# Patient Record
Sex: FEMALE | Race: WHITE | NOT HISPANIC OR LATINO | URBAN - METROPOLITAN AREA
[De-identification: names, ages, dates, MRNs, and addresses within clinical notes are randomized per-mention and may not be internally consistent; named-entity substitution may affect disease eponyms.]

---

## 2018-04-17 ENCOUNTER — OFFICE VISIT - HEALTHEAST (OUTPATIENT)
Dept: PEDIATRICS | Facility: CLINIC | Age: 23
End: 2018-04-17

## 2018-04-17 DIAGNOSIS — F41.9 ANXIETY: ICD-10-CM

## 2018-04-17 DIAGNOSIS — E55.9 VITAMIN D DEFICIENCY: ICD-10-CM

## 2018-04-17 DIAGNOSIS — F32.9 DEPRESSION, MAJOR, SINGLE EPISODE: ICD-10-CM

## 2018-04-17 LAB — HGB BLD-MCNC: 13.9 G/DL (ref 12–16)

## 2018-04-17 ASSESSMENT — MIFFLIN-ST. JEOR: SCORE: 1213.11

## 2018-04-18 LAB — 25(OH)D3 SERPL-MCNC: 46 NG/ML (ref 30–80)

## 2018-05-01 ENCOUNTER — OFFICE VISIT - HEALTHEAST (OUTPATIENT)
Dept: PEDIATRICS | Facility: CLINIC | Age: 23
End: 2018-05-01

## 2018-05-01 DIAGNOSIS — F32.9 DEPRESSION, MAJOR, SINGLE EPISODE: ICD-10-CM

## 2018-05-01 DIAGNOSIS — N92.1 METRORRHAGIA: ICD-10-CM

## 2018-05-01 DIAGNOSIS — F41.9 ANXIETY: ICD-10-CM

## 2018-05-01 DIAGNOSIS — R63.4 WEIGHT LOSS, UNINTENTIONAL: ICD-10-CM

## 2018-05-01 ASSESSMENT — MIFFLIN-ST. JEOR: SCORE: 1200.86

## 2018-05-16 ENCOUNTER — COMMUNICATION - HEALTHEAST (OUTPATIENT)
Dept: PEDIATRICS | Facility: CLINIC | Age: 23
End: 2018-05-16

## 2018-05-16 DIAGNOSIS — F32.A DEPRESSION: ICD-10-CM

## 2018-06-18 ENCOUNTER — COMMUNICATION - HEALTHEAST (OUTPATIENT)
Dept: PEDIATRICS | Facility: CLINIC | Age: 23
End: 2018-06-18

## 2018-06-18 DIAGNOSIS — F32.A DEPRESSION: ICD-10-CM

## 2019-08-05 ENCOUNTER — COMMUNICATION - HEALTHEAST (OUTPATIENT)
Dept: SCHEDULING | Facility: CLINIC | Age: 24
End: 2019-08-05

## 2019-08-06 ENCOUNTER — OFFICE VISIT - HEALTHEAST (OUTPATIENT)
Dept: FAMILY MEDICINE | Facility: CLINIC | Age: 24
End: 2019-08-06

## 2019-08-06 DIAGNOSIS — N10 ACUTE PYELONEPHRITIS: ICD-10-CM

## 2019-08-06 LAB
ALBUMIN UR-MCNC: ABNORMAL MG/DL
APPEARANCE UR: ABNORMAL
BACTERIA #/AREA URNS HPF: ABNORMAL HPF
BILIRUB UR QL STRIP: ABNORMAL
COLOR UR AUTO: YELLOW
GLUCOSE UR STRIP-MCNC: ABNORMAL MG/DL
GRAN CASTS #/AREA URNS LPF: ABNORMAL LPF
HGB UR QL STRIP: ABNORMAL
KETONES UR STRIP-MCNC: NEGATIVE MG/DL
LEUKOCYTE ESTERASE UR QL STRIP: NEGATIVE
NITRATE UR QL: NEGATIVE
PH UR STRIP: 6 [PH] (ref 5–8)
RBC #/AREA URNS AUTO: ABNORMAL HPF
SP GR UR STRIP: >=1.03 (ref 1–1.03)
SQUAMOUS #/AREA URNS AUTO: >100 LPF
UROBILINOGEN UR STRIP-ACNC: ABNORMAL
WBC #/AREA URNS AUTO: ABNORMAL HPF
WBC CLUMPS #/AREA URNS HPF: PRESENT /[HPF]

## 2019-08-07 LAB — BACTERIA SPEC CULT: NO GROWTH

## 2019-10-08 ENCOUNTER — OFFICE VISIT - HEALTHEAST (OUTPATIENT)
Dept: FAMILY MEDICINE | Facility: CLINIC | Age: 24
End: 2019-10-08

## 2019-10-08 DIAGNOSIS — R10.11 ABDOMINAL PAIN, RIGHT UPPER QUADRANT: ICD-10-CM

## 2019-10-08 DIAGNOSIS — R11.2 NON-INTRACTABLE VOMITING WITH NAUSEA, UNSPECIFIED VOMITING TYPE: ICD-10-CM

## 2019-10-08 DIAGNOSIS — R10.13 EPIGASTRIC PAIN: ICD-10-CM

## 2019-10-08 LAB
ALBUMIN SERPL-MCNC: 4.4 G/DL (ref 3.5–5)
ALP SERPL-CCNC: 54 U/L (ref 45–120)
ALT SERPL W P-5'-P-CCNC: 21 U/L (ref 0–45)
ANION GAP SERPL CALCULATED.3IONS-SCNC: 15 MMOL/L (ref 5–18)
AST SERPL W P-5'-P-CCNC: 17 U/L (ref 0–40)
BASOPHILS # BLD AUTO: 0 THOU/UL (ref 0–0.2)
BASOPHILS NFR BLD AUTO: 0 % (ref 0–2)
BILIRUB SERPL-MCNC: 0.8 MG/DL (ref 0–1)
BUN SERPL-MCNC: 11 MG/DL (ref 8–22)
CALCIUM SERPL-MCNC: 9.6 MG/DL (ref 8.5–10.5)
CHLORIDE BLD-SCNC: 104 MMOL/L (ref 98–107)
CO2 SERPL-SCNC: 20 MMOL/L (ref 22–31)
CREAT SERPL-MCNC: 0.78 MG/DL (ref 0.6–1.1)
EOSINOPHIL COUNT (ABSOLUTE): 0 THOU/UL (ref 0–0.4)
EOSINOPHIL NFR BLD AUTO: 0 % (ref 0–6)
ERYTHROCYTE [DISTWIDTH] IN BLOOD BY AUTOMATED COUNT: 12.7 % (ref 11–14.5)
FLUAV AG SPEC QL IA: NORMAL
FLUBV AG SPEC QL IA: NORMAL
GFR SERPL CREATININE-BSD FRML MDRD: >60 ML/MIN/1.73M2
GLUCOSE BLD-MCNC: 126 MG/DL (ref 70–125)
HCT VFR BLD AUTO: 41.6 % (ref 35–47)
HGB BLD-MCNC: 13.5 G/DL (ref 12–16)
LYMPHOCYTES # BLD AUTO: 0.4 THOU/UL (ref 0.8–4.4)
LYMPHOCYTES NFR BLD AUTO: 2 % (ref 20–40)
MCH RBC QN AUTO: 30.3 PG (ref 27–34)
MCHC RBC AUTO-ENTMCNC: 32.5 G/DL (ref 32–36)
MCV RBC AUTO: 94 FL (ref 80–100)
MONOCYTES # BLD AUTO: 1.1 THOU/UL (ref 0–0.9)
MONOCYTES NFR BLD AUTO: 5 % (ref 2–10)
PLAT MORPH BLD: NORMAL
PLATELET # BLD AUTO: 276 THOU/UL (ref 140–440)
PMV BLD AUTO: 12.9 FL (ref 8.5–12.5)
POTASSIUM BLD-SCNC: 4.5 MMOL/L (ref 3.5–5)
PROT SERPL-MCNC: 7.9 G/DL (ref 6–8)
RBC # BLD AUTO: 4.45 MILL/UL (ref 3.8–5.4)
SODIUM SERPL-SCNC: 139 MMOL/L (ref 136–145)
TOTAL NEUTROPHILS-ABS(DIFF): 20.4 THOU/UL (ref 2–7.7)
TOTAL NEUTROPHILS-REL(DIFF): 93 % (ref 50–70)
WBC: 21.9 THOU/UL (ref 4–11)

## 2019-10-08 ASSESSMENT — MIFFLIN-ST. JEOR: SCORE: 1217.19

## 2019-10-09 ENCOUNTER — COMMUNICATION - HEALTHEAST (OUTPATIENT)
Dept: FAMILY MEDICINE | Facility: CLINIC | Age: 24
End: 2019-10-09

## 2019-11-05 ENCOUNTER — COMMUNICATION - HEALTHEAST (OUTPATIENT)
Dept: PEDIATRICS | Facility: CLINIC | Age: 24
End: 2019-11-05

## 2019-11-05 DIAGNOSIS — F32.A DEPRESSION: ICD-10-CM

## 2019-11-19 ENCOUNTER — OFFICE VISIT - HEALTHEAST (OUTPATIENT)
Dept: FAMILY MEDICINE | Facility: CLINIC | Age: 24
End: 2019-11-19

## 2019-11-19 DIAGNOSIS — F33.1 MODERATE EPISODE OF RECURRENT MAJOR DEPRESSIVE DISORDER (H): ICD-10-CM

## 2019-11-19 DIAGNOSIS — F41.9 ANXIETY: ICD-10-CM

## 2019-11-19 ASSESSMENT — PATIENT HEALTH QUESTIONNAIRE - PHQ9: SUM OF ALL RESPONSES TO PHQ QUESTIONS 1-9: 15

## 2019-11-19 ASSESSMENT — ANXIETY QUESTIONNAIRES
2. NOT BEING ABLE TO STOP OR CONTROL WORRYING: NEARLY EVERY DAY
6. BECOMING EASILY ANNOYED OR IRRITABLE: NEARLY EVERY DAY
GAD7 TOTAL SCORE: 16
4. TROUBLE RELAXING: NEARLY EVERY DAY
1. FEELING NERVOUS, ANXIOUS, OR ON EDGE: NEARLY EVERY DAY
7. FEELING AFRAID AS IF SOMETHING AWFUL MIGHT HAPPEN: SEVERAL DAYS
5. BEING SO RESTLESS THAT IT IS HARD TO SIT STILL: SEVERAL DAYS
3. WORRYING TOO MUCH ABOUT DIFFERENT THINGS: MORE THAN HALF THE DAYS

## 2020-01-07 ENCOUNTER — OFFICE VISIT - HEALTHEAST (OUTPATIENT)
Dept: FAMILY MEDICINE | Facility: CLINIC | Age: 25
End: 2020-01-07

## 2020-01-07 DIAGNOSIS — F33.1 MODERATE EPISODE OF RECURRENT MAJOR DEPRESSIVE DISORDER (H): ICD-10-CM

## 2020-01-07 DIAGNOSIS — F41.9 ANXIETY: ICD-10-CM

## 2020-01-07 ASSESSMENT — ANXIETY QUESTIONNAIRES
6. BECOMING EASILY ANNOYED OR IRRITABLE: NEARLY EVERY DAY
3. WORRYING TOO MUCH ABOUT DIFFERENT THINGS: NEARLY EVERY DAY
2. NOT BEING ABLE TO STOP OR CONTROL WORRYING: NEARLY EVERY DAY
IF YOU CHECKED OFF ANY PROBLEMS ON THIS QUESTIONNAIRE, HOW DIFFICULT HAVE THESE PROBLEMS MADE IT FOR YOU TO DO YOUR WORK, TAKE CARE OF THINGS AT HOME, OR GET ALONG WITH OTHER PEOPLE: VERY DIFFICULT
5. BEING SO RESTLESS THAT IT IS HARD TO SIT STILL: MORE THAN HALF THE DAYS
1. FEELING NERVOUS, ANXIOUS, OR ON EDGE: NEARLY EVERY DAY
GAD7 TOTAL SCORE: 19
7. FEELING AFRAID AS IF SOMETHING AWFUL MIGHT HAPPEN: NEARLY EVERY DAY
4. TROUBLE RELAXING: MORE THAN HALF THE DAYS

## 2020-01-07 ASSESSMENT — PATIENT HEALTH QUESTIONNAIRE - PHQ9: SUM OF ALL RESPONSES TO PHQ QUESTIONS 1-9: 15

## 2020-02-03 ENCOUNTER — COMMUNICATION - HEALTHEAST (OUTPATIENT)
Dept: FAMILY MEDICINE | Facility: CLINIC | Age: 25
End: 2020-02-03

## 2020-02-11 ENCOUNTER — PRENATAL OFFICE VISIT - HEALTHEAST (OUTPATIENT)
Dept: FAMILY MEDICINE | Facility: CLINIC | Age: 25
End: 2020-02-11

## 2020-02-11 DIAGNOSIS — F32.1 MODERATE MAJOR DEPRESSION (H): ICD-10-CM

## 2020-02-11 DIAGNOSIS — N91.2 AMENORRHEA: ICD-10-CM

## 2020-02-11 DIAGNOSIS — F41.9 ANXIETY: ICD-10-CM

## 2020-02-11 LAB
HCG SERPL QL: ABNORMAL
HCG UR QL: NEGATIVE

## 2020-02-11 ASSESSMENT — PATIENT HEALTH QUESTIONNAIRE - PHQ9: SUM OF ALL RESPONSES TO PHQ QUESTIONS 1-9: 21

## 2020-02-11 ASSESSMENT — ANXIETY QUESTIONNAIRES
4. TROUBLE RELAXING: NEARLY EVERY DAY
2. NOT BEING ABLE TO STOP OR CONTROL WORRYING: NEARLY EVERY DAY
6. BECOMING EASILY ANNOYED OR IRRITABLE: NEARLY EVERY DAY
GAD7 TOTAL SCORE: 20
1. FEELING NERVOUS, ANXIOUS, OR ON EDGE: NEARLY EVERY DAY
3. WORRYING TOO MUCH ABOUT DIFFERENT THINGS: NEARLY EVERY DAY
7. FEELING AFRAID AS IF SOMETHING AWFUL MIGHT HAPPEN: NEARLY EVERY DAY
5. BEING SO RESTLESS THAT IT IS HARD TO SIT STILL: MORE THAN HALF THE DAYS

## 2020-02-12 ENCOUNTER — AMBULATORY - HEALTHEAST (OUTPATIENT)
Dept: LAB | Facility: CLINIC | Age: 25
End: 2020-02-12

## 2020-02-12 ENCOUNTER — COMMUNICATION - HEALTHEAST (OUTPATIENT)
Dept: FAMILY MEDICINE | Facility: CLINIC | Age: 25
End: 2020-02-12

## 2020-02-12 ENCOUNTER — AMBULATORY - HEALTHEAST (OUTPATIENT)
Dept: FAMILY MEDICINE | Facility: CLINIC | Age: 25
End: 2020-02-12

## 2020-02-12 DIAGNOSIS — N91.2 AMENORRHEA: ICD-10-CM

## 2020-02-12 LAB — HCG SERPL-ACNC: 13 MLU/ML (ref 0–4)

## 2020-02-13 ENCOUNTER — AMBULATORY - HEALTHEAST (OUTPATIENT)
Dept: FAMILY MEDICINE | Facility: CLINIC | Age: 25
End: 2020-02-13

## 2020-02-13 DIAGNOSIS — N91.2 AMENORRHEA: ICD-10-CM

## 2020-02-20 ENCOUNTER — AMBULATORY - HEALTHEAST (OUTPATIENT)
Dept: LAB | Facility: CLINIC | Age: 25
End: 2020-02-20

## 2020-02-20 DIAGNOSIS — N91.2 AMENORRHEA: ICD-10-CM

## 2020-02-20 LAB — HCG SERPL-ACNC: 3 MLU/ML (ref 0–4)

## 2020-02-21 ENCOUNTER — COMMUNICATION - HEALTHEAST (OUTPATIENT)
Dept: FAMILY MEDICINE | Facility: CLINIC | Age: 25
End: 2020-02-21

## 2020-02-25 ENCOUNTER — OFFICE VISIT - HEALTHEAST (OUTPATIENT)
Dept: FAMILY MEDICINE | Facility: CLINIC | Age: 25
End: 2020-02-25

## 2020-02-25 DIAGNOSIS — F32.1 MODERATE MAJOR DEPRESSION (H): ICD-10-CM

## 2020-02-25 DIAGNOSIS — Z30.013 ENCOUNTER FOR INITIAL PRESCRIPTION OF INJECTABLE CONTRACEPTIVE: ICD-10-CM

## 2020-02-25 DIAGNOSIS — F41.9 ANXIETY: ICD-10-CM

## 2020-02-25 ASSESSMENT — ANXIETY QUESTIONNAIRES
6. BECOMING EASILY ANNOYED OR IRRITABLE: NEARLY EVERY DAY
4. TROUBLE RELAXING: NEARLY EVERY DAY
3. WORRYING TOO MUCH ABOUT DIFFERENT THINGS: NEARLY EVERY DAY
2. NOT BEING ABLE TO STOP OR CONTROL WORRYING: NEARLY EVERY DAY
1. FEELING NERVOUS, ANXIOUS, OR ON EDGE: NEARLY EVERY DAY
5. BEING SO RESTLESS THAT IT IS HARD TO SIT STILL: MORE THAN HALF THE DAYS
GAD7 TOTAL SCORE: 19
7. FEELING AFRAID AS IF SOMETHING AWFUL MIGHT HAPPEN: MORE THAN HALF THE DAYS

## 2020-02-25 ASSESSMENT — PATIENT HEALTH QUESTIONNAIRE - PHQ9: SUM OF ALL RESPONSES TO PHQ QUESTIONS 1-9: 19

## 2020-05-10 ENCOUNTER — COMMUNICATION - HEALTHEAST (OUTPATIENT)
Dept: FAMILY MEDICINE | Facility: CLINIC | Age: 25
End: 2020-05-10

## 2020-05-12 ENCOUNTER — COMMUNICATION - HEALTHEAST (OUTPATIENT)
Dept: FAMILY MEDICINE | Facility: CLINIC | Age: 25
End: 2020-05-12

## 2020-05-13 ENCOUNTER — OFFICE VISIT - HEALTHEAST (OUTPATIENT)
Dept: FAMILY MEDICINE | Facility: CLINIC | Age: 25
End: 2020-05-13

## 2020-05-13 DIAGNOSIS — F32.1 MODERATE MAJOR DEPRESSION (H): ICD-10-CM

## 2020-05-13 DIAGNOSIS — F41.9 ANXIETY: ICD-10-CM

## 2020-05-13 ASSESSMENT — ANXIETY QUESTIONNAIRES
2. NOT BEING ABLE TO STOP OR CONTROL WORRYING: NEARLY EVERY DAY
4. TROUBLE RELAXING: MORE THAN HALF THE DAYS
7. FEELING AFRAID AS IF SOMETHING AWFUL MIGHT HAPPEN: NEARLY EVERY DAY
5. BEING SO RESTLESS THAT IT IS HARD TO SIT STILL: NEARLY EVERY DAY
GAD7 TOTAL SCORE: 20
1. FEELING NERVOUS, ANXIOUS, OR ON EDGE: NEARLY EVERY DAY
6. BECOMING EASILY ANNOYED OR IRRITABLE: NEARLY EVERY DAY
3. WORRYING TOO MUCH ABOUT DIFFERENT THINGS: NEARLY EVERY DAY

## 2020-05-13 ASSESSMENT — PATIENT HEALTH QUESTIONNAIRE - PHQ9: SUM OF ALL RESPONSES TO PHQ QUESTIONS 1-9: 21

## 2020-08-03 ENCOUNTER — COMMUNICATION - HEALTHEAST (OUTPATIENT)
Dept: FAMILY MEDICINE | Facility: CLINIC | Age: 25
End: 2020-08-03

## 2020-08-05 ENCOUNTER — OFFICE VISIT - HEALTHEAST (OUTPATIENT)
Dept: FAMILY MEDICINE | Facility: CLINIC | Age: 25
End: 2020-08-05

## 2020-08-05 DIAGNOSIS — F41.9 ANXIETY: ICD-10-CM

## 2020-08-05 DIAGNOSIS — R00.0 TACHYCARDIA: ICD-10-CM

## 2020-08-05 DIAGNOSIS — F32.1 MODERATE MAJOR DEPRESSION (H): ICD-10-CM

## 2020-08-05 LAB — TSH SERPL DL<=0.005 MIU/L-ACNC: 1.16 UIU/ML (ref 0.3–5)

## 2020-08-05 ASSESSMENT — ANXIETY QUESTIONNAIRES
2. NOT BEING ABLE TO STOP OR CONTROL WORRYING: NEARLY EVERY DAY
7. FEELING AFRAID AS IF SOMETHING AWFUL MIGHT HAPPEN: NEARLY EVERY DAY
1. FEELING NERVOUS, ANXIOUS, OR ON EDGE: NEARLY EVERY DAY
IF YOU CHECKED OFF ANY PROBLEMS ON THIS QUESTIONNAIRE, HOW DIFFICULT HAVE THESE PROBLEMS MADE IT FOR YOU TO DO YOUR WORK, TAKE CARE OF THINGS AT HOME, OR GET ALONG WITH OTHER PEOPLE: EXTREMELY DIFFICULT
3. WORRYING TOO MUCH ABOUT DIFFERENT THINGS: NEARLY EVERY DAY
4. TROUBLE RELAXING: NEARLY EVERY DAY
GAD7 TOTAL SCORE: 21
6. BECOMING EASILY ANNOYED OR IRRITABLE: NEARLY EVERY DAY
5. BEING SO RESTLESS THAT IT IS HARD TO SIT STILL: NEARLY EVERY DAY

## 2020-08-05 ASSESSMENT — PATIENT HEALTH QUESTIONNAIRE - PHQ9: SUM OF ALL RESPONSES TO PHQ QUESTIONS 1-9: 24

## 2020-09-01 ENCOUNTER — OFFICE VISIT - HEALTHEAST (OUTPATIENT)
Dept: BEHAVIORAL HEALTH | Facility: CLINIC | Age: 25
End: 2020-09-01

## 2020-09-01 DIAGNOSIS — F32.1 CURRENT MODERATE EPISODE OF MAJOR DEPRESSIVE DISORDER WITHOUT PRIOR EPISODE (H): ICD-10-CM

## 2020-09-01 DIAGNOSIS — F32.1 MODERATE MAJOR DEPRESSION (H): ICD-10-CM

## 2020-09-01 DIAGNOSIS — F41.9 ANXIETY: ICD-10-CM

## 2020-09-01 ASSESSMENT — PATIENT HEALTH QUESTIONNAIRE - PHQ9: SUM OF ALL RESPONSES TO PHQ QUESTIONS 1-9: 24

## 2020-09-01 ASSESSMENT — ANXIETY QUESTIONNAIRES
6. BECOMING EASILY ANNOYED OR IRRITABLE: NEARLY EVERY DAY
1. FEELING NERVOUS, ANXIOUS, OR ON EDGE: NEARLY EVERY DAY
7. FEELING AFRAID AS IF SOMETHING AWFUL MIGHT HAPPEN: NEARLY EVERY DAY
4. TROUBLE RELAXING: NEARLY EVERY DAY
2. NOT BEING ABLE TO STOP OR CONTROL WORRYING: NEARLY EVERY DAY
3. WORRYING TOO MUCH ABOUT DIFFERENT THINGS: NEARLY EVERY DAY
GAD7 TOTAL SCORE: 21
5. BEING SO RESTLESS THAT IT IS HARD TO SIT STILL: NEARLY EVERY DAY

## 2020-09-18 ENCOUNTER — COMMUNICATION - HEALTHEAST (OUTPATIENT)
Dept: FAMILY MEDICINE | Facility: CLINIC | Age: 25
End: 2020-09-18

## 2020-09-18 DIAGNOSIS — F41.9 ANXIETY: ICD-10-CM

## 2020-09-18 DIAGNOSIS — F32.1 MODERATE MAJOR DEPRESSION (H): ICD-10-CM

## 2020-09-21 ENCOUNTER — COMMUNICATION - HEALTHEAST (OUTPATIENT)
Dept: FAMILY MEDICINE | Facility: CLINIC | Age: 25
End: 2020-09-21

## 2020-10-28 ENCOUNTER — OFFICE VISIT - HEALTHEAST (OUTPATIENT)
Dept: BEHAVIORAL HEALTH | Facility: CLINIC | Age: 25
End: 2020-10-28

## 2020-10-28 DIAGNOSIS — F12.90 USES MARIJUANA: ICD-10-CM

## 2020-10-28 DIAGNOSIS — F32.1 MODERATE MAJOR DEPRESSION (H): ICD-10-CM

## 2020-10-28 DIAGNOSIS — F41.0 PANIC ATTACKS: ICD-10-CM

## 2020-10-28 DIAGNOSIS — F41.9 ANXIETY: ICD-10-CM

## 2020-10-28 ASSESSMENT — ANXIETY QUESTIONNAIRES
4. TROUBLE RELAXING: NEARLY EVERY DAY
3. WORRYING TOO MUCH ABOUT DIFFERENT THINGS: NEARLY EVERY DAY
6. BECOMING EASILY ANNOYED OR IRRITABLE: NEARLY EVERY DAY
2. NOT BEING ABLE TO STOP OR CONTROL WORRYING: NEARLY EVERY DAY
1. FEELING NERVOUS, ANXIOUS, OR ON EDGE: NEARLY EVERY DAY
7. FEELING AFRAID AS IF SOMETHING AWFUL MIGHT HAPPEN: NEARLY EVERY DAY
5. BEING SO RESTLESS THAT IT IS HARD TO SIT STILL: MORE THAN HALF THE DAYS
GAD7 TOTAL SCORE: 20

## 2020-10-28 ASSESSMENT — PATIENT HEALTH QUESTIONNAIRE - PHQ9: SUM OF ALL RESPONSES TO PHQ QUESTIONS 1-9: 21

## 2020-10-29 ENCOUNTER — COMMUNICATION - HEALTHEAST (OUTPATIENT)
Dept: BEHAVIORAL HEALTH | Facility: CLINIC | Age: 25
End: 2020-10-29

## 2020-11-05 ENCOUNTER — COMMUNICATION - HEALTHEAST (OUTPATIENT)
Dept: BEHAVIORAL HEALTH | Facility: CLINIC | Age: 25
End: 2020-11-05

## 2020-11-05 DIAGNOSIS — F32.1 CURRENT MODERATE EPISODE OF MAJOR DEPRESSIVE DISORDER WITHOUT PRIOR EPISODE (H): ICD-10-CM

## 2021-01-23 ENCOUNTER — COMMUNICATION - HEALTHEAST (OUTPATIENT)
Dept: FAMILY MEDICINE | Facility: CLINIC | Age: 26
End: 2021-01-23

## 2021-02-12 ENCOUNTER — OFFICE VISIT - HEALTHEAST (OUTPATIENT)
Dept: BEHAVIORAL HEALTH | Facility: CLINIC | Age: 26
End: 2021-02-12

## 2021-02-12 DIAGNOSIS — F41.0 PANIC ATTACKS: ICD-10-CM

## 2021-02-12 DIAGNOSIS — F32.1 CURRENT MODERATE EPISODE OF MAJOR DEPRESSIVE DISORDER WITHOUT PRIOR EPISODE (H): ICD-10-CM

## 2021-02-12 RX ORDER — BUSPIRONE HYDROCHLORIDE 10 MG/1
10 TABLET ORAL 3 TIMES DAILY PRN
Qty: 90 TABLET | Refills: 0 | Status: SHIPPED | OUTPATIENT
Start: 2021-02-12

## 2021-02-12 ASSESSMENT — ANXIETY QUESTIONNAIRES
2. NOT BEING ABLE TO STOP OR CONTROL WORRYING: NEARLY EVERY DAY
GAD7 TOTAL SCORE: 21
3. WORRYING TOO MUCH ABOUT DIFFERENT THINGS: NEARLY EVERY DAY
7. FEELING AFRAID AS IF SOMETHING AWFUL MIGHT HAPPEN: NEARLY EVERY DAY
IF YOU CHECKED OFF ANY PROBLEMS ON THIS QUESTIONNAIRE, HOW DIFFICULT HAVE THESE PROBLEMS MADE IT FOR YOU TO DO YOUR WORK, TAKE CARE OF THINGS AT HOME, OR GET ALONG WITH OTHER PEOPLE: EXTREMELY DIFFICULT
6. BECOMING EASILY ANNOYED OR IRRITABLE: NEARLY EVERY DAY
4. TROUBLE RELAXING: NEARLY EVERY DAY
5. BEING SO RESTLESS THAT IT IS HARD TO SIT STILL: NEARLY EVERY DAY
1. FEELING NERVOUS, ANXIOUS, OR ON EDGE: NEARLY EVERY DAY

## 2021-02-12 ASSESSMENT — PATIENT HEALTH QUESTIONNAIRE - PHQ9: SUM OF ALL RESPONSES TO PHQ QUESTIONS 1-9: 23

## 2021-03-11 ENCOUNTER — COMMUNICATION - HEALTHEAST (OUTPATIENT)
Dept: BEHAVIORAL HEALTH | Facility: CLINIC | Age: 26
End: 2021-03-11

## 2021-03-11 DIAGNOSIS — F32.1 CURRENT MODERATE EPISODE OF MAJOR DEPRESSIVE DISORDER WITHOUT PRIOR EPISODE (H): ICD-10-CM

## 2021-03-18 ENCOUNTER — COMMUNICATION - HEALTHEAST (OUTPATIENT)
Dept: BEHAVIORAL HEALTH | Facility: CLINIC | Age: 26
End: 2021-03-18

## 2021-04-19 ENCOUNTER — COMMUNICATION - HEALTHEAST (OUTPATIENT)
Dept: BEHAVIORAL HEALTH | Facility: CLINIC | Age: 26
End: 2021-04-19

## 2021-04-19 DIAGNOSIS — F41.0 PANIC ATTACKS: ICD-10-CM

## 2021-04-19 DIAGNOSIS — F32.1 CURRENT MODERATE EPISODE OF MAJOR DEPRESSIVE DISORDER WITHOUT PRIOR EPISODE (H): ICD-10-CM

## 2021-05-26 ENCOUNTER — COMMUNICATION - HEALTHEAST (OUTPATIENT)
Dept: BEHAVIORAL HEALTH | Facility: CLINIC | Age: 26
End: 2021-05-26

## 2021-05-26 DIAGNOSIS — F32.1 CURRENT MODERATE EPISODE OF MAJOR DEPRESSIVE DISORDER WITHOUT PRIOR EPISODE (H): ICD-10-CM

## 2021-05-26 RX ORDER — ESCITALOPRAM OXALATE 20 MG/1
20 TABLET ORAL DAILY
Qty: 30 TABLET | Refills: 0 | Status: SHIPPED | OUTPATIENT
Start: 2021-05-26

## 2021-05-26 ASSESSMENT — PATIENT HEALTH QUESTIONNAIRE - PHQ9: SUM OF ALL RESPONSES TO PHQ QUESTIONS 1-9: 15

## 2021-05-27 ASSESSMENT — PATIENT HEALTH QUESTIONNAIRE - PHQ9
SUM OF ALL RESPONSES TO PHQ QUESTIONS 1-9: 19
SUM OF ALL RESPONSES TO PHQ QUESTIONS 1-9: 23
SUM OF ALL RESPONSES TO PHQ QUESTIONS 1-9: 21
SUM OF ALL RESPONSES TO PHQ QUESTIONS 1-9: 24
SUM OF ALL RESPONSES TO PHQ QUESTIONS 1-9: 15
SUM OF ALL RESPONSES TO PHQ QUESTIONS 1-9: 21
SUM OF ALL RESPONSES TO PHQ QUESTIONS 1-9: 21
SUM OF ALL RESPONSES TO PHQ QUESTIONS 1-9: 24

## 2021-05-28 ASSESSMENT — ANXIETY QUESTIONNAIRES
GAD7 TOTAL SCORE: 21
GAD7 TOTAL SCORE: 16
GAD7 TOTAL SCORE: 21
GAD7 TOTAL SCORE: 19
GAD7 TOTAL SCORE: 21
GAD7 TOTAL SCORE: 20
GAD7 TOTAL SCORE: 19

## 2021-05-31 NOTE — TELEPHONE ENCOUNTER
"Pt is calling in with fever of 103, chills, and left lower abdominal and back pain when breathing and walking. Pt rates pain \"8\". Pt had her IUD taken out last Monday, and Saturday she started experiencing these symptoms. Pt is not pregnant, and denies any vaginal bleeding or discharge.   Per protocol pt should be evaluated within 4 hours. Pt agrees with the plan, and will go to the ER. Advised pt to call back if she has further questions.     Butch Ortega RN [facility] Triage/Medication Refill    Reason for Disposition    [1] Constant abdominal pain AND [2] present > 2 hours    Protocols used: CONTRACEPTION - IUD SYMPTOMS AND [id number]      "

## 2021-05-31 NOTE — PROGRESS NOTES
Assessment:   1. Acute pyelonephritis  Patient has not responded to injection that was given at the ED. Recommend oral therapy and follow up with urine culture result.   - ciprofloxacin HCl (CIPRO) 500 MG tablet; Take 1 tablet (500 mg total) by mouth 2 (two) times a day for 10 days.  Dispense: 20 tablet; Refill: 0  - Urinalysis-UC if Indicated     Plan:  Plan:   1. Medications: ciprofloxacin  2. Maintain adequate hydration  3. Follow up if symptoms not improving, and prn.     Subjective:   Janet Rivera is a 23 y.o. female who complains of bilateral flank pain, foul smelling urine, frequency, nausea, pain in the left flank and in the right flank and urgency for 5 days. She was seen at the [facility] yesterday and ws referred to the ED where she was given an injection and was sent home. She returns today stating that her symptoms have not improved that she is having lots of pain shivering and with fever and chills.    The following portions of the patient's history were reviewed and updated as appropriate: allergies, current medications, past family history, past medical history, past social history, past surgical history and problem list.  Review of Systems  A 12 point comprehensive review of systems was negative except as noted.      Objective:      /69   Pulse (!) 137   Temp (!) 103  F (39.4  C) (Oral)   Wt 114 lb 6.4 oz (51.9 kg)   SpO2 98%   BMI 20.27 kg/m    General: alert, appears stated age and cooperative                 Lab work from ED showed elevated WBC

## 2021-06-01 VITALS — HEIGHT: 63 IN | WEIGHT: 105.1 LBS | BODY MASS INDEX: 18.62 KG/M2

## 2021-06-01 VITALS — BODY MASS INDEX: 19.1 KG/M2 | HEIGHT: 63 IN | WEIGHT: 107.8 LBS

## 2021-06-02 NOTE — PATIENT INSTRUCTIONS - HE
Blood test and nasal swab today - will call with test results    Symptom control:  -LOTS of water  -rest as able  -ok to use tylenol 1000mg three times a day and/or ibuprofen 600mg three times a day with meals as needed  -start zofran dissolvable for nausea/vomiting

## 2021-06-02 NOTE — TELEPHONE ENCOUNTER
Reason contacted:  Test results within this encounter   Information relayed:  Dr Ramos notes below. Pt stated she is feeling better and has no questions   Additional questions:  No  Further follow-up needed:  No  Okay to leave a detailed message:  No

## 2021-06-02 NOTE — PROGRESS NOTES
Assessment/Plan:    1. Abdominal pain, right upper quadrant  2. Epigastric pain  Unclear etiology at this time.  Differential includes: Influenza, viral gastroenteritis, cholecystitis, etc. Unlikely to be urinary given absence of urinary symptoms.  Vitals stable though patient currently tachycardic.  Recommend evaluation with testing as below.  Encourage patient to drink lots of water, adequate rest, Tylenol/ibuprofen as needed.  Discussed testing will likely point test down specific treatment path; recommend patient to call clinic or present to ER if symptoms worsen significantly.  - Comprehensive Metabolic Panel  - HM1(CBC and Differential)  - Influenza A/B Rapid Test- Nasal Swab  - HM1 (CBC with Diff)    3. Non-intractable vomiting with nausea, unspecified vomiting type  Will manage nausea with Zofran as below.  - ondansetron (ZOFRAN-ODT) 4 MG disintegrating tablet; Take 1 tablet (4 mg total) by mouth every 8 (eight) hours as needed for nausea.  Dispense: 20 tablet; Refill: 0      Follow up: pending test results    Love Benitez MD  [facility]    Subjective:    Patient ID: Janet Rivera is a 24 y.o. female is here today for abd pain, vomiting    abd pain, nausea/vomiting  -around 1am woke up with abd pain and nausea - vomited  -vomiting every 15-20 mins this morning - a little bloody at first but states may have been red from food  -mild amount diarrhea at 4am - no black or bloody  -head feels plugged and body feels heavy  -has chills but not necessarily feverish  -grandma gave her something for nausea but threw it up  -no recent travel  -recently ate a few snacks - ice cream, chips, cheese stick - no chinese food, yesterday ate a chicken breast with caesar salad at work  -no sick contacts  -no further issues from kidney infection in Aug 2019   -abd pain epigastric to RUQ - intermittent, lasts couple minutes      Patient Active Problem List   Diagnosis     Improper Nutrition     Acne      Dysmenorrhea     Weight loss, unintentional     Anxiety     Depression, major, single episode     Vitamin D deficiency     History reviewed. No pertinent surgical history.  Current Outpatient Medications on File Prior to Visit   Medication Sig Dispense Refill     sertraline (ZOLOFT) 50 MG tablet Take 1/2 tablet by mouth once daily for one week then one tablet daily 30 tablet 5     UNABLE TO FIND Med Name:       No current facility-administered medications on file prior to visit.      No Known Allergies  Social History     Socioeconomic History     Marital status: Single     Spouse name: Not on file     Number of children: Not on file     Years of education: Not on file     Highest education level: Not on file   Occupational History     Not on file   Social Needs     Financial resource strain: Not on file     Food insecurity:     Worry: Not on file     Inability: Not on file     Transportation needs:     Medical: Not on file     Non-medical: Not on file   Tobacco Use     Smoking status: Never Smoker     Smokeless tobacco: Never Used   Substance and Sexual Activity     Alcohol use: Not on file     Drug use: Not on file     Sexual activity: Not on file   Lifestyle     Physical activity:     Days per week: Not on file     Minutes per session: Not on file     Stress: Not on file   Relationships     Social connections:     Talks on phone: Not on file     Gets together: Not on file     Attends [employer]al service: Not on file     Active member of club or organization: Not on file     Attends meetings of clubs or organizations: Not on file     Relationship status: Not on file     Intimate partner violence:     Fear of current or ex partner: Not on file     Emotionally abused: Not on file     Physically abused: Not on file     Forced sexual activity: Not on file   Other Topics Concern     Not on file   Social History Narrative     Not on file     Review of systems is as stated in HPI, and the remainder of system review is  "otherwise negative.    Objective:      /70   Pulse (!) 125   Temp 99.1  F (37.3  C)   Resp 20   Ht 5' 3\" (1.6 m)   Wt 108 lb 11.2 oz (49.3 kg)   SpO2 98%   BMI 19.26 kg/m      General appearance: awake, NAD, thin  HEENT: atraumatic, normocephalic, PERRL, no scleral icterus or injection, TMs normal bilaterally without erythema or effusion, nose grossly normal, no erythema posterior oropharynx, moist mucous membranes  Neck: supple, no lymphadenopathy, normal ROM  CV: RRR, no murmurs/rubs/gallops, normal S1 and S2  Lungs: CTAB, no wheezes or crackles, breathing comfortably on room air, no cough observed  Abd: Hyperactive bowel sounds, soft, non-distended, mildly tender in right upper quadrant and middle low abdomen  Extremities: moving all extremities  Skin: no rashes or lesions  Neuro: alert, oriented x3, CNs grossly intact, no focal deficits appreciated  Psych: normal mood/affect/behavior, answering questions appropriately, linear thought process      "

## 2021-06-02 NOTE — TELEPHONE ENCOUNTER
----- Message from Rachelle Burton CMA sent at 10/9/2019  8:41 AM CDT -----    ----- Message -----  From: Love Benitez MD  Sent: 10/9/2019   8:23 AM CDT  To: Love Benitez Care Team Pool    Please call pt with results and inquire about current symptoms.    Hi Tea (pronounced Timur-ah),    The rest of your blood test results have returned.  Your white blood cell count is significantly elevated suggesting an infection is the cause of your symptoms.  Your electrolytes, kidney function and liver tests are all normal.     How are you feeling today? If not improving then I would recommend getting an imaging test done to look into this further. If feeling better then I think it would be reasonable to monitor your symptoms for now. Certainly if you are feeling significantly worse then I would recommend going to the ER for evaluation.    Thanks  Dr Benitez

## 2021-06-03 VITALS
HEART RATE: 125 BPM | TEMPERATURE: 99.1 F | DIASTOLIC BLOOD PRESSURE: 70 MMHG | WEIGHT: 108.7 LBS | BODY MASS INDEX: 19.26 KG/M2 | OXYGEN SATURATION: 98 % | HEIGHT: 63 IN | SYSTOLIC BLOOD PRESSURE: 118 MMHG | RESPIRATION RATE: 20 BRPM

## 2021-06-03 VITALS — BODY MASS INDEX: 20.27 KG/M2 | WEIGHT: 114.4 LBS

## 2021-06-03 NOTE — TELEPHONE ENCOUNTER
Medication Question or Clarification  Who is calling: Patient  What medication are you calling about? (include dose and sig)   sertraline (ZOLOFT) 50 MG tablet 30 tablet 5 6/19/2018     Sig: Take 1/2 tablet by mouth once daily for one week then one tablet daily    Who prescribed the medication?: Yaneth Peralta MD  What is your question/concern?: Patient states sertraline is not helping with her anxiety requesting for alternative medication .  Please advise .  Pharmacy: [facility] # [id number]  Okay to leave a detailed message?: No  Site CMT - Please call the pharmacy to obtain any additional needed information.

## 2021-06-03 NOTE — TELEPHONE ENCOUNTER
Left message to call back. Please relay 's message when she call back. Needs to est care with an adult provider.

## 2021-06-03 NOTE — TELEPHONE ENCOUNTER
Patient Returning Call  Reason for call:  refill  Information relayed to patient:  The writer read the following to patient per Dr Peralta: I have not seen her in 1.5 years, so I cannot do the refill by phone, especially not with a med change requested.    She needs to be seen.    At her age, it would be most appropriate for her to establish care with an adult provider for ongoing care.   Patient has additional questions:  No  If YES, what are your questions/concerns:  Tranferred to scheduling.   Okay to leave a detailed message?: No call back needed

## 2021-06-03 NOTE — TELEPHONE ENCOUNTER
I have not seen her in 1.5 years, so I cannot do the refill by phone, especially not with a med change requested.     She needs to be seen.     At her age, it would be most appropriate for her to establish care with an adult provider for ongoing care.

## 2021-06-03 NOTE — TELEPHONE ENCOUNTER
Last Office Visit  5/1/2018 Yaneth Peralta MD  Notes:  ASSESSMENT/PLAN:  1. Anxiety      2. Depression, major, single episode      3. Weight loss, unintentional      4. Metrorrhagia         Anxiety/depresion symptoms improving        Patient Instructions   Continue 50 mg of Sertraline for 1 week. If you are still not feeling better, increase to 1.5 tablets, 75 mg.      Follow up here on 5/17, 10:20 am     Find a therapist and schedule an appointment - you have the list     Schedule appointment at [facility]     [facility] at [facility] regarding birth control options before then   IUD and Nexplanon are very good options for lots of women            Last Filled:      sertraline (ZOLOFT) 50 MG tablet 30 tablet 5 6/19/2018  No   Sig: Take 1/2 tablet by mouth once daily for one week then one tablet daily   Sent to pharmacy as: sertraline (ZOLOFT) 50 MG tablet   E-Prescribing Status: Receipt confirmed by pharmacy (6/19/2018  7:01 PM CDT)       Next OV:  Visit date not found - nothing scheduled    Called and the phone numbers for pt are not correct.  Home phone is disconnected and cell phone is the wrong number.      Pt needs appt scheduled with PCP for follow up on this medication          Medication teed up for provider signature

## 2021-06-03 NOTE — PROGRESS NOTES
Assessment/Plan:    1. Anxiety  2. Moderate episode of recurrent major depressive disorder (H)  Patient here to discuss anxiety and depression.  PHQ 9 and goran 7 scores as listed below.  Symptoms currently impacting life.  Discussed treatment with medications and/or therapy.  Patient declines therapy at this time.  We discussed options for medication including SSRI therapy, given prior trial of Zoloft discussed restarting this and increasing dose or trying alternative SSRI, patient prefers trying alternative SSRI. We discussed other SSRI medications and potential side effects - pt wishes to proceed with medication. Will start celexa 10mg daily - ok to increase dose to 20mg daily in 2-4 weeks if needed to control symptom. Also discussed as needed medication for panic attack control - will start hydroxyzine as below.  Discussed that if patient develops thoughts of self-harm or suicide she should call immediately.  We will follow-up in 4 weeks.  - citalopram (CELEXA) 10 MG tablet; Take 1 tablet (10 mg total) by mouth daily.  Dispense: 30 tablet; Refill: 1  - hydrOXYzine HCl (ATARAX) 25 MG tablet; Take 1-2 tablets (25-50 mg total) by mouth 3 (three) times a day as needed for anxiety (panic attack).  Dispense: 30 tablet; Refill: 1  -PHQ9 and GAD7 done today    Follow up: 4 weeks for recheck    Love Benitez MD  [facility]    Subjective:    Patient ID: Janet Rivera is a 24 y.o. female is here today for anxiety    Anxiety   -hx zoloft 25-75mg in 2018 - didn't feel that this was helpful, took daily, no side effects  -daily, generalized   -past year feels like has gotten worse  -tried therapy in [place], did for 2.5 weeks - overall ok fit but didn't help/wasn't comfortable  -thinks she is having panic attacks in crowded areas - chest heaviness, palpitations - occurs approx once/week (at work)  -having some low mood/depression as well  -PHQ 9 score 15 today, no thoughts of self-harm  -Goran 7 score 16  today, very difficult to function      Patient Active Problem List   Diagnosis     Acne     Dysmenorrhea     Weight loss, unintentional     Anxiety     Depression, major, single episode     Vitamin D deficiency     History reviewed. No pertinent surgical history.  Current Outpatient Medications on File Prior to Visit   Medication Sig Dispense Refill     ondansetron (ZOFRAN-ODT) 4 MG disintegrating tablet Take 1 tablet (4 mg total) by mouth every 8 (eight) hours as needed for nausea. 20 tablet 0     sertraline (ZOLOFT) 50 MG tablet Take 1/2 tablet by mouth once daily for one week then one tablet daily 30 tablet 5     UNABLE TO FIND Med Name:       No current facility-administered medications on file prior to visit.      No Known Allergies  Social History     Socioeconomic History     Marital status: Single     Spouse name: Not on file     Number of children: Not on file     Years of education: Not on file     Highest education level: Not on file   Occupational History     Not on file   Social Needs     Financial resource strain: Not on file     Food insecurity:     Worry: Not on file     Inability: Not on file     Transportation needs:     Medical: Not on file     Non-medical: Not on file   Tobacco Use     Smoking status: Never Smoker     Smokeless tobacco: Never Used   Substance and Sexual Activity     Alcohol use: Not on file     Drug use: Not on file     Sexual activity: Not on file   Lifestyle     Physical activity:     Days per week: Not on file     Minutes per session: Not on file     Stress: Not on file   Relationships     Social connections:     Talks on phone: Not on file     Gets together: Not on file     Attends Orthodox service: Not on file     Active member of club or organization: Not on file     Attends meetings of clubs or organizations: Not on file     Relationship status: Not on file     Intimate partner violence:     Fear of current or ex partner: Not on file     Emotionally abused: Not on file      Physically abused: Not on file     Forced sexual activity: Not on file   Other Topics Concern     Not on file   Social History Narrative     Not on file     History reviewed. No pertinent family history.     Review of systems is as stated in HPI, and the remainder of system review is otherwise negative.    Objective:      /60   Pulse 98   Wt 109 lb 8 oz (49.7 kg)   BMI 19.40 kg/m      General appearance: awake, NAD  HEENT: atraumatic, normocephalic, no scleral icterus or injection, ears and nose grossly normal, moist mucous membranes  Lungs: breathing comfortably on room air  Extremities: moving all extremities  Neuro: alert, oriented x3, CNs grossly intact, no focal deficits appreciated  Psych: Depressed appearing mood, intermittently tearful, affect congruent, normal behaviors, answering questions appropriately, linear thought process

## 2021-06-03 NOTE — PATIENT INSTRUCTIONS - HE
Start celexa 10mg daily - if needed can increase to 20mg (2 tablets) after 2-4 weeks  Remember most common side effect is upset stomach but this typically goes away after a few days so keep taking the medication    Start hydroxyzine 25-50 mg (1-2 tablets) as needed for panic attack    Follow up in 4 weeks for recheck

## 2021-06-03 NOTE — TELEPHONE ENCOUNTER
Patient Returning Call  Reason for call:  Calling back on status of 11/5/19 message  Information relayed to patient:  Patient was informed that message is open for PCP review, will let PCP know has called again and will flag the chart.  Patient has additional questions:  Yes  If YES, what are your questions/concerns:  Needs answer please  Okay to leave a detailed message?: Yes

## 2021-06-04 VITALS
BODY MASS INDEX: 19.4 KG/M2 | WEIGHT: 109.5 LBS | HEART RATE: 98 BPM | DIASTOLIC BLOOD PRESSURE: 60 MMHG | SYSTOLIC BLOOD PRESSURE: 100 MMHG

## 2021-06-04 VITALS
BODY MASS INDEX: 21.11 KG/M2 | HEART RATE: 80 BPM | SYSTOLIC BLOOD PRESSURE: 100 MMHG | WEIGHT: 119.19 LBS | DIASTOLIC BLOOD PRESSURE: 60 MMHG

## 2021-06-04 VITALS
HEART RATE: 83 BPM | SYSTOLIC BLOOD PRESSURE: 100 MMHG | DIASTOLIC BLOOD PRESSURE: 60 MMHG | BODY MASS INDEX: 20.27 KG/M2 | WEIGHT: 114.44 LBS

## 2021-06-04 VITALS
SYSTOLIC BLOOD PRESSURE: 130 MMHG | WEIGHT: 121.06 LBS | DIASTOLIC BLOOD PRESSURE: 88 MMHG | HEART RATE: 108 BPM | TEMPERATURE: 98.2 F | BODY MASS INDEX: 21.45 KG/M2

## 2021-06-04 VITALS
DIASTOLIC BLOOD PRESSURE: 60 MMHG | BODY MASS INDEX: 20.57 KG/M2 | WEIGHT: 116.13 LBS | SYSTOLIC BLOOD PRESSURE: 100 MMHG

## 2021-06-05 VITALS — WEIGHT: 125 LBS | BODY MASS INDEX: 22.14 KG/M2

## 2021-06-05 NOTE — TELEPHONE ENCOUNTER
Please help arrange a first OB with Dr. Benitez if patient already [facility].

## 2021-06-05 NOTE — TELEPHONE ENCOUNTER
New Appointment Needed  What is the reason for the visit:    Pregnancy Confirmation Appt Needed  When was the first day of your last menstrual cycle?: 10/21  Have you done a home pregnancy test?: Yes: and also had an app[facility] with [facility] .    Provider Preference: PCP only  How soon do you need to be seen?: as soon as able   Waitlist offered?: No  Okay to leave a detailed message:  Yes

## 2021-06-05 NOTE — TELEPHONE ENCOUNTER
Left message to call back for: pt  Information to relay to patient:  Please help arrange a first OB with Dr. Benitez if patient already [facility].

## 2021-06-05 NOTE — PROGRESS NOTES
Assessment/Plan:    1. Anxiety  2. Moderate episode of recurrent major depressive disorder (H)  Uncontrolled symptoms at this time, worsened by recent stressful event; patient did not feel that Celexa 20 mg daily was helpful for her.  We discussed options including 1) restarting Celexa and increasing dose or 2) trying alternative medication.  After discussion patient wishes to restart Celexa and maximize dose.  She will restart Celexa at 20 mg daily and take this for 2 weeks, she will then increase dose to 40 mg daily and take this for 4 weeks.  Hydroxyzine discontinued and will start gabapentin for panic attacks.  Patient will follow-up in 6 weeks for recheck, sooner if needed.  - citalopram (CELEXA) 20 MG tablet; Start 1 tablet daily. Increase to 2 tablets daily after 2 weeks if needed  Dispense: 60 tablet; Refill: 2  - gabapentin (NEURONTIN) 100 MG capsule; Take 100 mg by mouth 3 (three) times a day as needed (anxiety/panic attack).  Dispense: 30 capsule; Refill: 1  - PHQ9 Depression Screen  - PHQ9 Depression Screen      Follow up: 6 weeks for recheck    Love Benitez MD  [facility]    Subjective:    Patient ID: Janet Rivera is a 24 y.o. female is here today for f/u mood    F/u mood  -ran out of medication 2 weeks ago - wanted to discuss next steps in person so waited until our visit today  -had increased to 2 tablets (20mg) per day after 1.5-2 weeks on medication  -didn't feel like was helping  -having panic attacks - 3-4x/week - tried hydroxyzine, at first it seemed to help but then wasn't as helpful, tried taking up to 2 tablets  -PHQ9 score 16 today, no thoughts of self harm  -GAD7 score 19 today  -reports seeing couples therapist with significant other 1-2x/week  -reports recent stressful incident - therapeutic  2 weeks ago, tearful mentioning this      Patient Active Problem List   Diagnosis     Acne     Dysmenorrhea     Weight loss, unintentional     Anxiety     Depression, major,  single episode     Vitamin D deficiency     History reviewed. No pertinent surgical history.  Current Outpatient Medications on File Prior to Visit   Medication Sig Dispense Refill     ondansetron (ZOFRAN-ODT) 4 MG disintegrating tablet Take 1 tablet (4 mg total) by mouth every 8 (eight) hours as needed for nausea. 20 tablet 0     sertraline (ZOLOFT) 50 MG tablet Take 1/2 tablet by mouth once daily for one week then one tablet daily 30 tablet 5     UNABLE TO FIND Med Name:       [DISCONTINUED] citalopram (CELEXA) 10 MG tablet Take 1 tablet (10 mg total) by mouth daily. 30 tablet 1     [DISCONTINUED] hydrOXYzine HCl (ATARAX) 25 MG tablet Take 1-2 tablets (25-50 mg total) by mouth 3 (three) times a day as needed for anxiety (panic attack). 30 tablet 1     No current facility-administered medications on file prior to visit.      No Known Allergies  Social History     Socioeconomic History     Marital status: Single     Spouse name: Not on file     Number of children: Not on file     Years of education: Not on file     Highest education level: Not on file   Occupational History     Not on file   Social Needs     Financial resource strain: Not on file     Food insecurity:     Worry: Not on file     Inability: Not on file     Transportation needs:     Medical: Not on file     Non-medical: Not on file   Tobacco Use     Smoking status: Never Smoker     Smokeless tobacco: Never Used   Substance and Sexual Activity     Alcohol use: Not on file     Drug use: Not on file     Sexual activity: Not on file   Lifestyle     Physical activity:     Days per week: Not on file     Minutes per session: Not on file     Stress: Not on file   Relationships     Social connections:     Talks on phone: Not on file     Gets together: Not on file     Attends Synagogue service: Not on file     Active member of club or organization: Not on file     Attends meetings of clubs or organizations: Not on file     Relationship status: Not on file      Intimate partner violence:     Fear of current or ex partner: Not on file     Emotionally abused: Not on file     Physically abused: Not on file     Forced sexual activity: Not on file   Other Topics Concern     Not on file   Social History Narrative     Not on file     History reviewed. No pertinent family history.  Review of systems is as stated in HPI, and the remainder of system review is otherwise negative.    Objective:      /60   Pulse 83   Wt 114 lb 7 oz (51.9 kg)   LMP 10/20/2019   Breastfeeding Unknown   BMI 20.27 kg/m      General appearance: awake, NAD  HEENT: atraumatic, normocephalic, no scleral icterus or injection, ears and nose grossly normal, moist mucous membranes  Lungs: breathing comfortably on room air  Extremities: moving all extremities  Neuro: alert, oriented x3, CNs grossly intact, no focal deficits appreciated  Psych: occasionally tearful, depressed appearing, normal affect/behavior, answering questions appropriately, linear thought process

## 2021-06-05 NOTE — PATIENT INSTRUCTIONS - HE
Restart celexa 20mg daily - increase to 2 tablets (40mg max dose) after 2 weeks if needed for mood control  Stay on this celexa dose (40mg) for 4-6 weeks and then come see me if not working  Start gabapentin 1 tablet three times a day as needed for panic attack - if not helping can take 2-3 tablets with each panic attack

## 2021-06-06 NOTE — TELEPHONE ENCOUNTER
Patient Returning Call  Reason for call:  Return call   Information relayed to patient:  Caller was informed of message below and was transferred to scheduling to set up LAB appointment.   Patient has additional questions:  No  If YES, what are your questions/concerns:  n.a  Okay to leave a detailed message?: No call back needed

## 2021-06-06 NOTE — TELEPHONE ENCOUNTER
----- Message from Love Benitez MD sent at 2/21/2020  2:05 PM CST -----  Please call pt to schedule nurse visit for depo if pt still wishes to do this. (then send back to me and I will put in future order)    Thanks  Dr Benitez

## 2021-06-06 NOTE — PROGRESS NOTES
Assessment/Plan:    1. Amenorrhea  Patient with positive pregnancy test in 2019, had  at [facility] on 2019, reports ultrasound done after this which demonstrated no retained products of conception.  Patient states since that time she has not had another period though has had some light spotting.  She also reports continuing to get home pregnancy test that are positive, most recently this past  (3 days ago).  She reports otherwise not feeling pregnant at this time.  UPT done today and negative.  Will check serum quant hCG at this time.  If serum hCG negative then patient okay to restart Depo for contraception; if serum hCG elevated then would trend.  We did discuss possibility of having irregular bleeding following .  - Pregnancy (Beta-hCG, Qual), Urine  - Beta-hCG, Qualitative, Serum    2.  Moderate major depression (H)  3.  Anxiety  Patient with uncontrolled anxiety and depressive symptoms at this time, PHQ 9 and goran 7 scores as below.  Patient reports self increasing Celexa to 40 mg approximately 3 weeks ago.  She reports running out of gabapentin recently, which she was using for panic attack management.  We discussed giving Celexa 40 mg another 1 to 2 weeks to see benefit and then to follow-up if benefit is not seen.  If Celexa is not beneficial for patient than this would be her second failed SSRI, and I would consider switching to alternative antidepressant class.  If patient is not pregnant then we could restart gabapentin for anxiety/panic.  Patient was initially scheduled to see me next week on  but states she needs to reschedule due to work, will reschedule today to be seen in 1 to 2 weeks.    Follow up: pending test result; 1 to 2 weeks for mood recheck    Love Benitez MD  [facility]    Subjective:    Patient ID: Janet Rivera is a 24 y.o. female is here today for amenorrhea    Amenorrhea   -LMP 10/21/19 - positive pregnancy test  at that time  -had  (with pills) on 19 at [facility] - had US afterwards and was told that all products of conception were gone  -has been testing positive with UPT this entire time (most recently on ) - thinking may be a new pregnancy  -has continued to be sexually active  -has had some light spotting but no true period  -still some nausea but nothing severe  -no breast changes  -some fatigue but nothing new  -not a planned pregnancy  -is thinking she would continue this pregnancy if positive today  -BV currently taking flagyl - 4 more days  -Patient is currently taking Celexa daily, ran out of gabapentin - for anxiety/depression - currently on celexa 40mg x 3 week  -Avery 7 score 20 today, previously 19  -PHQ 9 score 21 today, no thoughts of self-harm; previously 15      Patient Active Problem List   Diagnosis     Acne     Dysmenorrhea     Weight loss, unintentional     Anxiety     Depression, major, single episode     Vitamin D deficiency     History reviewed. No pertinent surgical history.  Current Outpatient Medications on File Prior to Visit   Medication Sig Dispense Refill     citalopram (CELEXA) 20 MG tablet Start 1 tablet daily. Increase to 2 tablets daily after 2 weeks if needed 60 tablet 2     gabapentin (NEURONTIN) 100 MG capsule Take 100 mg by mouth 3 (three) times a day as needed (anxiety/panic attack). 30 capsule 1     ondansetron (ZOFRAN-ODT) 4 MG disintegrating tablet Take 1 tablet (4 mg total) by mouth every 8 (eight) hours as needed for nausea. 20 tablet 0     [DISCONTINUED] sertraline (ZOLOFT) 50 MG tablet Take 1/2 tablet by mouth once daily for one week then one tablet daily 30 tablet 5     [DISCONTINUED] UNABLE TO FIND Med Name:       No current facility-administered medications on file prior to visit.      No Known Allergies  Social History     Socioeconomic History     Marital status: Single     Spouse name: Not on file     Number of children: Not on file     Years  of education: Not on file     Highest education level: Not on file   Occupational History     Not on file   Social Needs     Financial resource strain: Not on file     Food insecurity:     Worry: Not on file     Inability: Not on file     Transportation needs:     Medical: Not on file     Non-medical: Not on file   Tobacco Use     Smoking status: Never Smoker     Smokeless tobacco: Never Used   Substance and Sexual Activity     Alcohol use: Not on file     Drug use: Not on file     Sexual activity: Not on file   Lifestyle     Physical activity:     Days per week: Not on file     Minutes per session: Not on file     Stress: Not on file   Relationships     Social connections:     Talks on phone: Not on file     Gets together: Not on file     Attends Voodoo service: Not on file     Active member of club or organization: Not on file     Attends meetings of clubs or organizations: Not on file     Relationship status: Not on file     Intimate partner violence:     Fear of current or ex partner: Not on file     Emotionally abused: Not on file     Physically abused: Not on file     Forced sexual activity: Not on file   Other Topics Concern     Not on file   Social History Narrative     Not on file     History reviewed. No pertinent family history.  Review of systems is as stated in HPI, and the remainder of system review is otherwise negative.    Objective:      /60   Wt 116 lb 2 oz (52.7 kg)   LMP 10/21/2019   BMI 20.57 kg/m      General appearance: awake, NAD  HEENT: atraumatic, normocephalic, no scleral icterus or injection, ears and nose grossly normal, moist mucous membranes  Lungs: breathing comfortably on room air  Extremities: moving all extremities  Skin: no rashes or lesions  Neuro: alert, oriented x3, CNs grossly intact, no focal deficits appreciated  Psych: flat affect, normal behaviors, answering questions appropriately, linear thought process

## 2021-06-06 NOTE — PATIENT INSTRUCTIONS - HE
Unclear what is fully going on with your body and these positive home tests  Urine pregnancy test negative today  Will check blood test for pregnancy hormone (HCG) - if positive then we will need to track this (usually means we check it every 2-3 days until increasing or zero)  If HCG level is negative/zero then can start depo for birth control - nurse visit next week    Reminder: your bleeding/periods may be abnormal for another month or so after     Continue celexa 40mg for another 1-2 weeks and if not working then come back to discuss next steps

## 2021-06-06 NOTE — PROGRESS NOTES
Assessment/Plan:    1. Moderate major depression (H)  2. Anxiety  Uncontrolled depressive and anxiety symptoms.  Patient tried both Zoloft and Celexa without improvement, therefore will switch to alternative medication class.  Recommend taper off Celexa; patient will decrease to 20 mg daily for 1 week then 10 mg daily for 1 week then stop.  Discussed when patient has been on 20 mg daily for a few days then she could start Effexor up titration; she will not increase Effexor dose until she is off Celexa completely.  She will call/[facility] message with any questions or concerns; otherwise follow-up in 3 to 4 weeks for recheck.  Prescription of gabapentin also sent to pharmacy to restart for panic attacks.  - venlafaxine (EFFEXOR XR) 37.5 MG 24 hr capsule; Take 1 capsule (37.5 mg total) by mouth daily.  Dispense: 60 capsule; Refill: 1  - gabapentin (NEURONTIN) 100 MG capsule; Take 100 mg by mouth 3 (three) times a day as needed (anxiety/panic attack).  Dispense: 60 capsule; Refill: 1    3. Encounter for initial prescription of injectable contraceptive  Given recent trend of serum hCG (last level essentially negative), okay to start Depo at this time.  Patient will continue this every 3 months.  - medroxyPROGESTERone injection 150 mg (DEPO-PROVERA)      Follow up: 4 weeks for recheck    Love Benitez MD  [facility]    Subjective:    Patient ID: Janet Rivera is a 24 y.o. female is here today for f/u mood    F/u mood  -pt last seen on 2/11/20 - worsening anxiety/depression - decided to give celexa 40mg a another 1-2 weeks and if not working then would taper off and switch to alternative; ok to start depo if not pregnant; ok to restart gabapentin for anxiety if not pregnant  -f/u HCG quant done and no pregnancy  -no significant change since last visit  -4x/week having high anixety/panic  -PHQ 9 score 19 today, no thoughts of self-harm (down from 21 previously)  -Avery 7 score 19 today  -Patient is interesting  in switching off Celexa to alternative medication  -Patient does wish to start Depo      Patient Active Problem List   Diagnosis     Acne     Dysmenorrhea     Weight loss, unintentional     Anxiety     Depression, major, single episode     Vitamin D deficiency     Moderate major depression (H)     History reviewed. No pertinent surgical history.  Current Outpatient Medications on File Prior to Visit   Medication Sig Dispense Refill     citalopram (CELEXA) 20 MG tablet Start 1 tablet daily. Increase to 2 tablets daily after 2 weeks if needed 60 tablet 2     ondansetron (ZOFRAN-ODT) 4 MG disintegrating tablet Take 1 tablet (4 mg total) by mouth every 8 (eight) hours as needed for nausea. 20 tablet 0     [DISCONTINUED] gabapentin (NEURONTIN) 100 MG capsule Take 100 mg by mouth 3 (three) times a day as needed (anxiety/panic attack). 30 capsule 1     No current facility-administered medications on file prior to visit.      No Known Allergies  Social History     Socioeconomic History     Marital status: Single     Spouse name: Not on file     Number of children: Not on file     Years of education: Not on file     Highest education level: Not on file   Occupational History     Not on file   Social Needs     Financial resource strain: Not on file     Food insecurity:     Worry: Not on file     Inability: Not on file     Transportation needs:     Medical: Not on file     Non-medical: Not on file   Tobacco Use     Smoking status: Never Smoker     Smokeless tobacco: Never Used   Substance and Sexual Activity     Alcohol use: Not on file     Drug use: Not on file     Sexual activity: Not on file   Lifestyle     Physical activity:     Days per week: Not on file     Minutes per session: Not on file     Stress: Not on file   Relationships     Social connections:     Talks on phone: Not on file     Gets together: Not on file     Attends Anabaptist service: Not on file     Active member of club or organization: Not on file      Attends meetings of clubs or organizations: Not on file     Relationship status: Not on file     Intimate partner violence:     Fear of current or ex partner: Not on file     Emotionally abused: Not on file     Physically abused: Not on file     Forced sexual activity: Not on file   Other Topics Concern     Not on file   Social History Narrative     Not on file     History reviewed. No pertinent family history.     Review of systems is as stated in HPI, and the remainder of system review is otherwise negative.    Objective:      /60   Pulse 80   Wt 119 lb 3 oz (54.1 kg)   BMI 21.11 kg/m      General appearance: awake, NAD  HEENT: atraumatic, normocephalic, no scleral icterus or injection, ears and nose grossly normal, moist mucous membranes  Lungs: breathing comfortably on room air  Extremities: moving all extremities  Skin: no rashes or lesions  Neuro: alert, oriented x3, CNs grossly intact, no focal deficits appreciated  Psych: Depressed appearing with flat affect, normal behaviors, answering questions appropriately, linear thought process

## 2021-06-06 NOTE — TELEPHONE ENCOUNTER
"----- Message from Love Benitez MD sent at 2/12/2020  8:20 AM CST -----  Please help pt get on lab only schedule.    Hi Tea,    The pregnancy hormone in your blood came back \"equivocal\" meaning not elevated but also not negative - we should recheck again today or tomorrow. Dr Benitez will place an order now and staff will help get you on the lab schedule.    Thanks  Dr Benitez  "

## 2021-06-06 NOTE — PATIENT INSTRUCTIONS - HE
Start celexa taper:  -go down to 20mg (1 tablet) daily for 1 week  -decrease to 1/2 tablet (10 mg) daily for 1 week and then stop    Start venlafaxine (effexor) 37.5mg daily - start in a few days after you have been taking 20mg for a few days  When you stop celexa then you can increase effexor to 75mg daily (2 tablets)    We should meet again somewhat after getting off celexa and increasing effexor dose - around 3-4 weeks from now    Restart gabapentin as needed for high anxiety/panic attacks    Depo shot today -  Every 3 months

## 2021-06-08 NOTE — PROGRESS NOTES
"Janet Rivera is a 24 y.o. female who is being evaluated via a billable video visit.      The patient has been notified of following:     \"This video visit will be conducted via a call between you and your physician/provider. We have found that certain health care needs can be provided without the need for an in-person physical exam.  This service lets us provide the care you need with a video conversation.  If a prescription is necessary we can send it directly to your pharmacy.  If lab work is needed we can place an order for that and you can then stop by our lab to have the test done at a later time.    Video visits are billed at different rates depending on your insurance coverage. Please reach out to your insurance provider with any questions.    If during the course of the call the physician/provider feels a video visit is not appropriate, you will not be charged for this service.\"    Patient has given verbal consent to a Video visit? Yes    Patient would like to receive their AVS by AVS Preference: Pastor.    Patient would like the video invitation sent by: Text to cell phone: [phone]    Will anyone else be joining your video visit? No        Video Start Time: 11:25am    Additional provider notes:   Assessment/Plan:    1. Moderate major depression (H)  2. Anxiety  Uncontrolled depression and anxiety symptoms. Will increase effexor to 150mg daily and increase gabapentin to 300mg three times a day PRN for panic attacks. Also recommend starting therapy and pt agrees - referral placed. encouraged pt to continue going outside at least once per day and getting some form of exercise/movement. Will follow up in 2 weeks, sooner if needed.   If no improvement in 2 weeks could consider: further increasing effexor dose, adding low dose antipsychotic medication at bedtime, change gabapentin to buspar, refer to psychiatry.  - gabapentin (NEURONTIN) 300 MG capsule; Take 1 capsule (300 mg total) by mouth 3 (three) times " a day as needed (anxiety/panic attack).  Dispense: 90 capsule; Refill: 1  - venlafaxine (EFFEXOR-XR) 150 MG 24 hr capsule; Take 1 capsule (150 mg total) by mouth daily.  Dispense: 30 capsule; Refill: 1  - AMB REFERRAL TO MENTAL HEALTH AND ADDICTION  - Adult (18+); Outpatient Treatment; Individual/Couples/Family/Group Therapy/Health Psychology; [facility] [phone]; We will contact you to schedule the appointment or ple...      Follow up: 2 weeks    Love Benitez MD  [facility]    Subjective:    Patient ID: Janet Rivera is a 24 y.o. female is seen via video visit for f/u mood    F/u depression/anxiety  -pt was last seen on 2/25/20 for mood f/u - plan at that time to taper off celexa and switch to effexor, continue gabapentin as needed  -pt reports overall no change since last visit  -currently taking effexor XR 75mg daily and gabapentin 100-200mg three times a day PRN  -PHQ9 score 21 today, no thoughts of self harm (essentially unchanged score since last visit)  -GAD7 score 20 today (essentially unchanged score since last visit)  -pt reports no side effects from effexor and no side effects from 200mg gabapentin - just doesn't feel that these are working  -having panic attacks daily  -reports trying online therapy about 1 month ago for approx 2 weeks, interacted with mental health provider via texting - didn't think it was helping so stopped  -not currently working d/t COVID19      Patient Active Problem List   Diagnosis     Acne     Dysmenorrhea     Weight loss, unintentional     Anxiety     Depression, major, single episode     Vitamin D deficiency     Moderate major depression (H)     History reviewed. No pertinent surgical history.  Current Outpatient Medications on File Prior to Visit   Medication Sig Dispense Refill     [DISCONTINUED] gabapentin (NEURONTIN) 100 MG capsule Take 100 mg by mouth 3 (three) times a day as needed (anxiety/panic attack). 60 capsule 1      [DISCONTINUED] venlafaxine (EFFEXOR XR) 37.5 MG 24 hr capsule Take 1 capsule (37.5 mg total) by mouth daily. 60 capsule 1     [DISCONTINUED] citalopram (CELEXA) 20 MG tablet Start 1 tablet daily. Increase to 2 tablets daily after 2 weeks if needed 60 tablet 2     [DISCONTINUED] ondansetron (ZOFRAN-ODT) 4 MG disintegrating tablet Take 1 tablet (4 mg total) by mouth every 8 (eight) hours as needed for nausea. 20 tablet 0     No current facility-administered medications on file prior to visit.      No Known Allergies  Social History     Socioeconomic History     Marital status: Single     Spouse name: Not on file     Number of children: Not on file     Years of education: Not on file     Highest education level: Not on file   Occupational History     Not on file   Social Needs     Financial resource strain: Not on file     Food insecurity     Worry: Not on file     Inability: Not on file     Transportation needs     Medical: Not on file     Non-medical: Not on file   Tobacco Use     Smoking status: Never Smoker     Smokeless tobacco: Never Used   Substance and Sexual Activity     Alcohol use: Not on file     Drug use: Not on file     Sexual activity: Not on file   Lifestyle     Physical activity     Days per week: Not on file     Minutes per session: Not on file     Stress: Not on file   Relationships     Social connections     Talks on phone: Not on file     Gets together: Not on file     Attends Hinduism service: Not on file     Active member of club or organization: Not on file     Attends meetings of clubs or organizations: Not on file     Relationship status: Not on file     Intimate partner violence     Fear of current or ex partner: Not on file     Emotionally abused: Not on file     Physically abused: Not on file     Forced sexual activity: Not on file   Other Topics Concern     Not on file   Social History Narrative     Not on file     History reviewed. No pertinent family history.  Review of systems is as  stated in HPI, and the remainder system review is otherwise negative.    Objective:      There were no vitals taken for this visit.    General appearance: awake, NAD  HEENT: atraumatic, normocephalic  Lungs: breathing comfortably on room air, no cough  Neuro: alert, oriented x3, CNs grossly intact, no focal deficits appreciated  Psych: normal mood/affect/behavior - appeared tearful at one time during visit, answering questions appropriately, linear thought process      Video-Visit Details    Type of service:  Video Visit    Video End Time (time video stopped): 11:36am  Originating Location (pt. Location): Home    Distant Location (provider location):  [facility]/OB     Platform used for Video Visit: Kirstie Benitez MD

## 2021-06-10 NOTE — PATIENT INSTRUCTIONS - HE
Phone number for therapy (possible also for psychiatry): [facility] [phone]    Increase effexor to max dose 225mg daily (150mg + 75mg)  Continue gabapentin can do 3 tablets (900mg) three times a day as needed

## 2021-06-10 NOTE — TELEPHONE ENCOUNTER
Medication Question or Clarification  Who is calling: The patient   What medication are you calling about (include dose and sig)?:   gabapentin (NEURONTIN) 300 MG capsule 300 mg, Oral, 3 times daily PRN        Summary: Take 1 capsule (300 mg total) by mouth 3 (three) times a day as needed (anxiety/panic attack)., Starting Wed 5/13/2020, Normal   Dose, Frequency: 300 mg, 3 times daily PRN  Start: 5/13/2020  Ord/Sold: 5/13/2020 (O)  Report  Adh:   Taking:   Long-term:   Pharmacy: [facility] [address] [place] [address] AT [place] [id number]  Med Dose History       Patient Sig: Take 1 capsule (300 mg total) by mouth 3 (three) times a day as needed (anxiety/panic attack).       Ordered on: 5/13/2020       Authorized by: LOVE BENITEZ       Dispense: 90 capsule       Refills: 1 ordered        venlafaxine (EFFEXOR-XR) 150 MG 24 hr capsule 150 mg, Oral, DAILY        Summary: Take 1 capsule (150 mg total) by mouth daily., Starting Wed 5/13/2020, Normal   Dose, Frequency: 150 mg, DAILY  Start: 5/13/2020  Ord/Sold: 5/13/2020 (O)  Report  Adh:   Taking:   Long-term:   Pharmacy: [facility] [address] [place] [address] AT [place] [address]  Med Dose History       Patient Sig: Take 1 capsule (150 mg total) by mouth daily.       Ordered on: 5/13/2020       Authorized by: LOVE BENITEZ       Dispense: 30 capsule       Refills: 1 ordered          Who prescribed the medication?: Love Benitez MD   What is your question/concern?: The patient states she would like to talk to Love Benitez MD because she doesn't think the medications are helping her anxiety/ panic attacks.  Requested Pharmacy: [facility]  Okay to leave a detailed message?: Yes  __________________________________  The patient states Love Benitez MD was going to giver her a list of mental health counseling therapists. The patient would like the list sent to my chart.

## 2021-06-10 NOTE — PROGRESS NOTES
Assessment/Plan:    1. Moderate major depression (H)  2. Anxiety  Uncontrolled symptoms. PHQ9 and GAD7 scores as below. Recommend referral to psychiatry and therapy, pt agrees. While waiting to get in to psychiatry, will increase effexor to max 225mg daily dose. Will also increase gabapentin to help with panic attacks. Pt will call if having concerns but discussed importance of getting into see psychiatry and therapy and she voices understanding/agreement.   - PHQ9 Depression Screen  - AMB REFERRAL TO MENTAL HEALTH AND ADDICTION  - Adult (18+); Psychiatry, ECT and Medication Management; Psychiatry; [facility] [phone]; We will contact you to schedule the appointment or please call with any ques...  - AMB REFERRAL TO MENTAL HEALTH AND ADDICTION  - Adult (18+); Outpatient Treatment; Individual/Couples/Family/Group Therapy/Health Psychology; [facility]  - venlafaxine (EFFEXOR XR) 75 MG 24 hr capsule; Take 1 capsule (75 mg total) by mouth daily. Take with 150mg to get max dose 225mg daily  Dispense: 30 capsule; Refill: 1  - venlafaxine (EFFEXOR-XR) 150 MG 24 hr capsule; Take 1 capsule (150 mg total) by mouth daily. Take with 75mg to get max dose 225mg daily  Dispense: 30 capsule; Refill: 1  - Thyroid Stimulating Hormone (TSH)  - gabapentin (NEURONTIN) 300 MG capsule; Take 2-3 capsules (600-900 mg total) by mouth 3 (three) times a day as needed (anxiety/panic attack).  Dispense: 90 capsule; Refill: 1    3. Tachycardia  Pt with anxiety and intermittent tachycardia, last TSH was nml in 2015, will recheck to rule out thyroid disease.  - Thyroid Stimulating Hormone (TSH)      Follow up: 2-4 weeks if needed     Love Benitez MD  [facility]    Subjective:    Patient ID: Janet Rivera is a 24 y.o. female is here today for f/u mood    F/u mood  -hx anxiety/panic and depression - currently taking effexor XR 150mg daily and gabapentin 300-600mg three times a day  as needed  -no change since last visit - feels the same  -PHQ9 score 24 today, no thoughts of self harm/suicide  -GAD7 score 21 today, extremely difficult to function  -the past 2 weeks has been laying in bed, low energy/anhedonia  -daily panic attacks  -pt reports never getting a phone call to schedule therapy      Patient Active Problem List   Diagnosis     Acne     Dysmenorrhea     Weight loss, unintentional     Anxiety     Depression, major, single episode     Vitamin D deficiency     Moderate major depression (H)     History reviewed. No pertinent surgical history.  Current Outpatient Medications on File Prior to Visit   Medication Sig Dispense Refill     [DISCONTINUED] gabapentin (NEURONTIN) 300 MG capsule Take 1 capsule (300 mg total) by mouth 3 (three) times a day as needed (anxiety/panic attack). 90 capsule 1     [DISCONTINUED] venlafaxine (EFFEXOR-XR) 150 MG 24 hr capsule Take 1 capsule (150 mg total) by mouth daily. 30 capsule 1     No current facility-administered medications on file prior to visit.      No Known Allergies  Social History     Socioeconomic History     Marital status: Single     Spouse name: Not on file     Number of children: Not on file     Years of education: Not on file     Highest education level: Not on file   Occupational History     Not on file   Social Needs     Financial resource strain: Not on file     Food insecurity     Worry: Not on file     Inability: Not on file     Transportation needs     Medical: Not on file     Non-medical: Not on file   Tobacco Use     Smoking status: Never Smoker     Smokeless tobacco: Never Used   Substance and Sexual Activity     Alcohol use: Not on file     Drug use: Not on file     Sexual activity: Not on file   Lifestyle     Physical activity     Days per week: Not on file     Minutes per session: Not on file     Stress: Not on file   Relationships     Social connections     Talks on phone: Not on file     Gets together: Not on file     Attends  Cheondoism service: Not on file     Active member of club or organization: Not on file     Attends meetings of clubs or organizations: Not on file     Relationship status: Not on file     Intimate partner violence     Fear of current or ex partner: Not on file     Emotionally abused: Not on file     Physically abused: Not on file     Forced sexual activity: Not on file   Other Topics Concern     Not on file   Social History Narrative     Not on file     History reviewed. No pertinent family history.  Review of systems is as stated in HPI, and the remainder of system review is otherwise negative.    Objective:      /88   Pulse (!) 108   Temp 98.2  F (36.8  C)   Wt 121 lb 1 oz (54.9 kg)   BMI 21.45 kg/m      General appearance: awake, NAD  HEENT: atraumatic, normocephalic, no scleral icterus or injection  Lungs: breathing comfortably on room air  Extremities: moving all extremities  Neuro: alert, oriented x3, CNs grossly intact, no focal deficits appreciated  Psych: somewhat flat affect, normal behaviors, depressed mood, answering questions appropriately, linear thought process

## 2021-06-10 NOTE — TELEPHONE ENCOUNTER
Per review of last virtual visit note patient was to follow-up in 2 weeks therefore arranged an office visit on Wednesday at 10 am with Dr. Benitez.
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
## 2021-06-11 NOTE — PROGRESS NOTES
________________________________________  Medications Phoned  to Pharmacy [] yes [x]no  Name of Pharmacist:  List Medications, including dose, quantity and instructions    Medications ordered this visit were e-scribed.  Verified by order class [x] yes  [] no  Lexapro and Effexor XR  Medication changes or discontinuations were communicated to patient's pharmacy: [] yes  [x] no  Rx for the Effexor XR indicates taper   Dictation completed at time of chart check: [x] yes  [] no    I have checked the documentation for today s encounters and the above information has been reviewed and completed.

## 2021-06-11 NOTE — PATIENT INSTRUCTIONS - HE
"1. Schedule individual therapy appointment. Schedulers will call you separate of this clinic  2. START taking Effexor 150mg for 3 days, then 75mg for next 3 days, and discontinue completely afterwards.   a. AT SAME TIME- START taking Lexpro 10mg daily for depression and anxiety  3. Continue medications as prescribed  4. Have your pharmacy contact us for a refill if you are running low on medications (We may ask you to come into clinic to get a refill from the nurse)  5. No alcohol or drug use  6. No driving if sedated  7. Contact the clinic with any questions or concerns   a. Phone: [phone]  b. Fax: [phone]  8. Reach out for help if you feel like hurting yourself or others:   a. [facility]: [address] [place] [address], [id number] (phone: [phone])  b. [facility] Suicide Hotline: [phone]   c. Crisis Texting Line: Text \"[place]\" to [phone]  d. Call 911 or go to nearest Emergency room   9. Follow up as directed in 1 month, for your appointments, per your After Visit Summary Form    "

## 2021-06-12 NOTE — TELEPHONE ENCOUNTER
Date of Last Office Visit: 10-28-20  Date of Next Office Visit: 11-25-20  No shows since last visit: 0  Cancellations since last visit: 0  ED visits since last visit:  0  Medication Lexapro date last ordered: 9-1-20  Qty: 30  Refills: 0  Lapse in therapy greater than 7 days: yes  Medication refill request verified as identical to current order: yes  Result of Last DAM, VPA, Li+ Level, CBC, or Carbamazepine Level (at or since last visit): N/A     [] Medication refilled per [facility].   [x] Medication unable to be refilled by RN due to criteria not met as indicated below:     []Eligibility - not seen in last year    []Supervision - no future appointment    [x]Compliance     []Verification - order discrepancy    []Controlled Medication    []Medication not included in RN Protocol    []90 - day supply request    []Other   Current Medication list:  Your Current Medications Are    escitalopram oxalate (LEXAPRO) 10 MG tablet Take 1 tablet (10 mg total) by mouth daily.   gabapentin (NEURONTIN) 300 MG capsule Take 2-3 capsules (600-900 mg total) by mouth 3 (three) times a day as needed (anxiety/panic attack).   medroxyprogesterone acetate (DEPO-PROVERA IM) Inject into the shoulder, thigh, or buttocks. Per pt report - last inj was in May   propranoloL (INDERAL) 10 MG tablet Take 1 tablet (10 mg total) by mouth 3 (three) times a day as needed (anxiety).   venlafaxine (EFFEXOR-XR) 75 MG 24 hr capsule Take 2 capsules (150 mg total) by mouth daily for 3 days, THEN 1 capsule (75 mg total) daily for 3 days.       Medication Plan of Care at last office visit with MD/CNP:Plan  1. Continue to monitor for safety  2. Current Medications  1. RE-INITIATE Lexapro 10mg daily for anxiety and depression  2. INITIATE propranolol 10mg three times a day for anxiety  3. Continue gabapentin 600-900mg three times a day PRN for anxiety  4. REFILLS: see above  1. Labs ordered this visit: n/a  2. INITIATE individual psychotherapy appointments for mood  stabilization and nonpharmacologic coping. Collaborate with interdisciplinary care team as needed.  3. ROIs: unable to complete today virtually.   4. Consent to Communicate: unable to complete today virtually.   3. Patient will continue abstinence from drugs and alcohol  4. Patient to return to clinic in 4 weeks for evaluation of medication trials and continued assessment. Ongoing patient psychoeducation regarding chronic illness and treatment.   1. Patient educated that they may schedule sooner appointment or contact writer for any worsening or lack of improvement in symptoms  I reviewed the potential risks, side effects, and benefits of all medications with the patient. Patient verbalized understanding and was encouraged to call clinic with further questions or concerns.
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
## 2021-06-12 NOTE — PATIENT INSTRUCTIONS - HE
"1. START taking Lexapro 10mg daily for anxiety  2. START taking propranolol three times a day as needed for anxiety/panic  3. Continue other medications as prescribed  4. Have your pharmacy contact us for a refill if you are running low on medications (We may ask you to come into clinic to get a refill from the nurse)  5. No alcohol or drug use  6. No driving if sedated  7. Contact the clinic with any questions or concerns   a. Phone: [phone]  b. Fax: [phone]  8. Reach out for help if you feel like hurting yourself or others:   a. [facility]: [address] [place] [address], [address] (phone: [phone])  b. [facility] Suicide Hotline: [phone]   c. Crisis Texting Line: Text \"[place]\" to [phone]  d. Call 911 or go to nearest Emergency room   9. Follow up as directed in 1 month by video for your appointment    "

## 2021-06-12 NOTE — PROGRESS NOTES
This video/telephone visit will be conducted via a call between you and your physician/provider. We have found that certain health care needs can be provided without the need for an in-person physical exam. This service lets us provide the care you need with a video /telephone conversation. If a prescription is necessary we can send it directly to your pharmacy. If lab work is needed we can place an order for that and you can then stop by our lab to have the test done at a later time.    Just as we bill insurance for in-person visits, we also bill insurance for video/telephone visits. If you have questions about your insurance coverage, we recommend that you speak with your insurance company.    Patient has given verbal consent for video/Telephone visit? yes  Patient would like the video visit invitation sent by: Text to cell phone: JEANETTE or Send to email: Pastor MEJIA/FLAKITA : Ta LIU LPN    Patient verified allergies, medications and pharmacy via phone. PHQ : 21 and TATIANA: 20 done verbally with writer. Patient states she  is ready for visit.    :    Fill Date ID Written Sold Drug Qty Days Prescriber Rx # Pharmacy Refill Daily Dose * Pymt Type    08/06/2020 1 05/13/2020 08/14/2020 Gabapentin 300 Mg Capsule  90.00 30 [facility] [id number] [id number] 1/1  [place]   05/13/2020 1 05/13/2020 05/15/2020 Gabapentin 300 Mg Capsule  90.00 30 [facility] [id number] [id number] 0/1  [place]   02/25/2020 1 02/25/2020 02/25/2020 Gabapentin 100 Mg Capsule  60.00 20 [facility] [id number] [id number] 0/[place]   01/20/2020 1 01/07/2020 01/22/2020 Gabapentin 100 Mg Capsule  30.00 10 [facility] [id number] [id number] 1/[facility]  [place]   01/07/2020 1 01/07/2020 01/07/2020 Gabapentin 100 Mg Capsule  30.00 10 [facility] [id number] [id number] 0/[facility]  [place]   12/17/2019 1 12/17/2019 12/17/2019 Oxycodone-Acetaminophen 5-325  4.00 3 [facility] [id number] [id number] 0/0 10.00 [facility] [place]         ________________________________________  Medications Phoned  to  Pharmacy [] yes [x]no  Name of Pharmacist:  List Medications, including dose, quantity and instructions    Medications ordered this visit were e-scribed.  Verified by order class [x] yes  [] no  propanolol  Medication changes or discontinuations were communicated to patient's pharmacy: [] yes  [x] no    Dictation completed at time of chart check: [] yes  [x] no    I have checked the documentation for today s encounters and the above information has been reviewed and completed.

## 2021-06-15 NOTE — TELEPHONE ENCOUNTER
Date of Last Office Visit: 2-12-21  Date of Next Office Visit: 3-19-21  No shows since last visit: 0  Cancellations since last visit: 0    Medication requested: lexapro Date last ordered: 2-12-21 Qty: 30 Refills: 0     Lapse in medication adherence greater than 5 days?: no    Medication refill request verified as identical to current order?: yes  Result of Last DAM, VPA, Li+ Level, CBC, or Carbamazepine Level (at or since last visit): N/A    [x]Medication refilled per  Medication Refill in Ambulatory Care  policy.  []Medication unable to be refilled by RN due to criteria not met as indicated below:    []Eligibility - not seen in the last year   []Supervision - no future appointment   []Compliance - no shows, cancellations or lapse in therapy   []Verification - order discrepancy   []Controlled medication   []Medication not included in policy   []90-day supply request   []Other

## 2021-06-15 NOTE — PROGRESS NOTES
________________________________________  Medications Phoned  to Pharmacy [] yes [x]no  Name of Pharmacist:  List Medications, including dose, quantity and instructions    Medications ordered this visit were e-scribed.  Verified by order class [x] yes  [] no  Lexapro 20 mg  Buspar 10 mg    Medication changes or discontinuations were communicated to patient's pharmacy: [x] yes  [] no  Propranolol 10 mg  Gabapentin 300 mg    Dictation completed at time of chart check: [x] yes  [] no    I have checked the documentation for today s encounters and the above information has been reviewed and completed.
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
## 2021-06-15 NOTE — PATIENT INSTRUCTIONS - HE
"1. Schedule individual therapy appointment  2. START taking Lexapro 20mg daily for anxiety   3. START taking Buspar 10mg three times a day as needed for anxiety  4. STOP taking propranolol as needed for anxiety  5. Continue medications as prescribed  6. Have your pharmacy contact us for a refill if you are running low on medications (We may ask you to come into clinic to get a refill from the nurse)  7. No alcohol or drug use  8. No driving if sedated  9. Contact the clinic with any questions or concerns   a. Phone: [phone]  b. Fax: [phone]  10. Reach out for help if you feel like hurting yourself or others:   a. [facility]: [address] [place] [address], [address] (phone: [phone])  b. [facility] Suicide Hotline: [phone]   c. Crisis Texting Line: Text \"[place]\" to 047993  d. Call 911 or go to nearest Emergency room   11. Follow up as directed in 4-6 weeks by video for your appointment    "

## 2021-06-15 NOTE — PROGRESS NOTES
This video/telephone visit will be conducted via a call between you and your physician/provider. We have found that certain health care needs can be provided without the need for an in-person physical exam. This service lets us provide the care you need with a video /telephone conversation. If a prescription is necessary we can send it directly to your pharmacy. If lab work is needed we can place an order for that and you can then stop by our lab to have the test done at a later time.    Just as we bill insurance for in-person visits, we also bill insurance for video/telephone visits. If you have questions about your insurance coverage, we recommend that you speak with your insurance company.    Patient has given verbal consent for video/Telephone visit? Yes  Patient would like the video visit invitation sent by: [employer], if connection issues, please call:  [phone]   ROBERTO/FLAKITA BENITEZ CMA    Patient verified allergies, medications and pharmacy via phone. PHQ: 23 and TATIANA: 21 done verbally with writer. Patient states she is ready for visit.

## 2021-06-16 PROBLEM — F32.1 MODERATE MAJOR DEPRESSION (H): Status: ACTIVE | Noted: 2020-02-11

## 2021-06-16 PROBLEM — E55.9 VITAMIN D DEFICIENCY: Status: ACTIVE | Noted: 2018-04-17

## 2021-06-16 PROBLEM — F32.9 DEPRESSION, MAJOR, SINGLE EPISODE: Status: ACTIVE | Noted: 2018-04-17

## 2021-06-16 PROBLEM — F41.0 PANIC ATTACKS: Status: ACTIVE | Noted: 2020-10-28

## 2021-06-16 PROBLEM — F41.9 ANXIETY: Status: ACTIVE | Noted: 2018-04-17

## 2021-06-16 NOTE — TELEPHONE ENCOUNTER
Writer called to reschedule appt as provider is uto- scheduled for next available 4/6. Patient reports needing refill of Lexapro- will be out in 2 days.    [id number] [place] [address] AT [facility] & [place] [address] Phone:  [phone]   Fax:  [phone]          Thank you!

## 2021-06-16 NOTE — TELEPHONE ENCOUNTER
Reviewed requested medications. Will refill Lexapro x1 month. Refusing Buspar as due for follow up visit and unclear if continued taking since our last visit. Patient needs follow up appointment for further refill considerations.

## 2021-06-16 NOTE — TELEPHONE ENCOUNTER
"Telephone Encounter by Darcy Rene LPN at 2/12/2020  2:15 PM     Author: Darcy Rene LPN Service: -- Author Type: Licensed Nurse    Filed: 2/12/2020  2:16 PM Encounter Date: 2/12/2020 Status: Signed    : Darcy Rene LPN (Licensed Nurse)       Patient Returning Call  Reason for call:  Patient returning call   Information relayed to patient:  Rachelle Burton CMA           2/12/20 8:41 AM   Note      ----- Message from Love Benitez MD sent at 2/12/2020  8:20 AM CST -----  Please help pt get on lab only schedule.     Hi Tea,     The pregnancy hormone in your blood came back \"equivocal\" meaning not elevated but also not negative - we should recheck again today or tomorrow. Dr Benitez will place an order now and staff will help get you on the lab schedule.     Thanks  Dr Benitez      Patient has additional questions:  No  If YES, what are your questions/concerns:  Patient verbalized understanding above message   Okay to leave a detailed message?: No           "

## 2021-06-16 NOTE — TELEPHONE ENCOUNTER
Date of Last Office Visit: 2/12/2021  Date of Next Office Visit: none (schedule, please connect with patient and schedule follow up appointment with the provide)  No shows since last visit: non  Cancellations since last visit: 1-provider initiated     Medication requested: Escitalopram 20 mg tablet Date last ordered: 3/11/2021 Qty: 30 Refills: 0     Review of [place] ?: n/a      Lapse in medication adherence greater than 5 days?: no  If yes, call patient and gather details: no  Medication refill request verified as identical to current order?: yes  Result of Last DAM, VPA, Li+ Level, CBC, or Carbamazepine Level (at or since last visit): N/A    []Medication refilled per  Medication Refill in Ambulatory Care  policy.  [x]Medication unable to be refilled by RN due to criteria not met as indicated below:    []Eligibility - not seen in the last year   [x]Supervision - no future appointment   []Compliance - no shows, cancellations or lapse in therapy   []Verification - order discrepancy   []Controlled medication   []Medication not included in policy   []90-day supply request   []Other

## 2021-06-17 ENCOUNTER — COMMUNICATION - HEALTHEAST (OUTPATIENT)
Dept: BEHAVIORAL HEALTH | Facility: CLINIC | Age: 26
End: 2021-06-17

## 2021-06-17 NOTE — PROGRESS NOTES
"ASSESSMENT/PLAN:  1. Anxiety     2. Depression, major, single episode     3. Weight loss, unintentional     4. Metrorrhagia       Anxiety/depresion symptoms improving      Patient Instructions   Continue 50 mg of Sertraline for 1 week. If you are still not feeling better, increase to 1.5 tablets, 75 mg.     Follow up here on 5/17, 10:20 am    Find a therapist and schedule an appointment - you have the list    Schedule appointment at [facility]    Look at [facility] regarding birth control options before then   IUD and Nexplanon are very good options for lots of women        No Follow-up on file.    CHIEF COMPLAINT:  Chief Complaint   Patient presents with     Follow-up     better       HISTORY OF PRESENT ILLNESS:  Janet is a 22 y.o. female presenting to the clinic today for an anxiety and depression follow up.    Janet was seen here on 4/17/18 with concerns about anxiety and depression. Her symptoms began while in college at [employer]. She left after her first year 1 year ago. She was started on Sertraline, 25 mg for 1 week. She has been taking 50 mg for 6 days. She reports that she feels the same. She has not yet found a counselor but is looking for one.     Sleep: She started sleeping at an earlier bedtime so she has been getting more sleep but still wakes up about 3 times during the night. She uses her phone when she cannot go to sleep. She was awake from 3-4 am this morning. She watches TV before bed. She has a hard time falling asleep.    Birth Control: LMP ended on 4/18/18 and lasted about 7 days. She started bleeding again 2 days ago, she describes it as heavy and dark. She was taking Aviane for 1 month but stopped about 2 weeks ago. She was previously getting the Depo shot for 1 year. Before that, she was on a \"continuous\" birth control pill and had not had her period in 7 years. Her acne was worse on that pill but did improve with Aviane. She is not interested in getting an IUD because the " "thought of it makes her uncomfortable and she does not want her acne to get worse. She has been seeing a gynecologist at [facility]. She has had a long term boyfriend who she is sexually active with. They use condoms.     REVIEW OF SYSTEMS:   No headaches or stomachaches.  She does not eat 3 meals a day. She snacks often throughout the day. Her weight is down 2 lbs from her last visit. She denies that she is trying to lose weight.   All other systems are negative.    PFSH:  History reviewed. No pertinent past medical history.  History reviewed. No pertinent surgical history.     PHQ9 = 12/somewhat difficult, 17/very difficult on 4/19  GAD7 = 9/somewhat difficult, 13/very difficult  on 4/19    VITALS:  Vitals:    05/01/18 1024   BP: 118/68   Pulse: 82   SpO2: 99%   Weight: 105 lb 1.6 oz (47.7 kg)   Height: 5' 3\" (1.6 m)     Wt Readings from Last 3 Encounters:   05/01/18 105 lb 1.6 oz (47.7 kg)   04/17/18 107 lb 12.8 oz (48.9 kg)   11/06/15 99 lb 8 oz (45.1 kg) (4 %, Z= -1.70)*     * Growth percentiles are based on CDC 2-20 Years data.     Body mass index is 18.62 kg/(m^2).    PHYSICAL EXAM:  General Appearance: Alert and no distress, appears stated age.        ADDITIONAL HISTORY SUMMARIZED (2): None.  DECISION TO OBTAIN EXTRA INFORMATION (1): None.   RADIOLOGY TESTS (1): None.  LABS (1): None.  MEDICINE TESTS (1): None.  INDEPENDENT REVIEW (2 each): None.     The following are part of a depression follow up plan for the patient:  implementation of measures to provide psychological support    The visit lasted a total of 16 minutes face to face with the patient. Over 50% of the time was spent counseling and educating the patient about anxiety and depression medication.    Isaac OSMAN, am scribing for and in the presence of, Dr. Peralta.    I, Dr. Yaneth Peralta, personally performed the services described in this documentation, as scribed by Isaac Obregon in my presence, and it is both accurate and " complete.    MEDICATIONS:  Current Outpatient Prescriptions   Medication Sig Dispense Refill     sertraline (ZOLOFT) 50 MG tablet Take 1/2 tablet by mouth once daily for one week then one tablet daily 30 tablet 0     No current facility-administered medications for this visit.        Total data points: 0
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
## 2021-06-21 NOTE — LETTER
Letter by Love Benitez MD at      Author: Love Benitez MD Service: -- Author Type: --    Filed:  Encounter Date: 1/23/2021 Status: (Other)         January 26, 2021     Patient: Janet Rivera   YOB: 1995   Date of Visit: 1/23/2021       To Whom It May Concern:    Janet Rivera had acute symptoms causing her to miss work yesterday - we discussed her symptoms and my medical opinion is that she may now return to work safely. Please excuse her yesterday due to her symptoms.    If you have any questions or concerns, please don't hesitate to call.    Sincerely,        Electronically signed by Love Benitez MD

## 2021-06-26 ENCOUNTER — HEALTH MAINTENANCE LETTER (OUTPATIENT)
Age: 26
End: 2021-06-26

## 2021-06-26 NOTE — TELEPHONE ENCOUNTER
Writer tried several times to reach patient via telephone for today's appointment. Mobile number the voice mail was full and unable to leave message.  Tried the home number and her grandmother answered with no RAFAEL on file no message could be left.     Please see provider's directives in Teams under Gael for plan for future treatment.     Provider was notified via teams messenger.

## 2021-06-29 NOTE — PROGRESS NOTES
Progress Notes by Greta Serrato LPN at 9/1/2020  1:00 PM     Author: Greta Serrato LPN Service: -- Author Type: Licensed Nurse    Filed: 9/1/2020  4:44 PM Encounter Date: 9/1/2020 Status: Signed    : Greta Serrato LPN (Licensed Nurse)       This video/telephone visit will be conducted via a call between you and your physician/provider. We have found that certain health care needs can be provided without the need for an in-person physical exam. This service lets us provide the care you need with a video /telephone conversation. If a prescription is necessary we can send it directly to your pharmacy. If lab work is needed we can place an order for that and you can then stop by our lab to have the test done at a later time.   Just as we bill insurance for in-person visits, we also bill insurance for video/telephone visits. If you have questions about your insurance coverage, we recommend that you speak with your insurance company.   Patient has given verbal consent for video/Telephone visit? yes  Patient would like the video visit invitation sent by: Text to cell phone: NA or Send to email: Face[employer] Mobile  ROBERTO/LPN : AD, LPN  Pt wants to discuss her current meds - does not feel they are working. She is complaining of depression and anxiety.   Patient verified allergies, medications and pharmacy via FaceOn Mobile. PHQ : 24 and TATIANA: 21 done verbally with writer. Patient states she is ready for visit.

## 2021-10-16 ENCOUNTER — HEALTH MAINTENANCE LETTER (OUTPATIENT)
Age: 26
End: 2021-10-16

## 2021-10-19 PROBLEM — F32.9 MAJOR DEPRESSION: Status: ACTIVE | Noted: 2020-02-11

## 2022-02-03 ENCOUNTER — OFFICE VISIT (OUTPATIENT)
Dept: FAMILY MEDICINE | Facility: CLINIC | Age: 27
End: 2022-02-03
Payer: COMMERCIAL

## 2022-02-03 VITALS — SYSTOLIC BLOOD PRESSURE: 112 MMHG | WEIGHT: 108.2 LBS | BODY MASS INDEX: 19.17 KG/M2 | DIASTOLIC BLOOD PRESSURE: 62 MMHG

## 2022-02-03 DIAGNOSIS — F32.1 MODERATE MAJOR DEPRESSION (H): Primary | ICD-10-CM

## 2022-02-03 DIAGNOSIS — F41.9 ANXIETY: ICD-10-CM

## 2022-02-03 PROCEDURE — 99214 OFFICE O/P EST MOD 30 MIN: CPT | Performed by: FAMILY MEDICINE

## 2022-02-03 RX ORDER — QUETIAPINE FUMARATE 25 MG/1
25 TABLET, FILM COATED ORAL 3 TIMES DAILY PRN
Qty: 60 TABLET | Refills: 1 | Status: SHIPPED | OUTPATIENT
Start: 2022-02-03

## 2022-02-03 RX ORDER — TRAZODONE HYDROCHLORIDE 50 MG/1
50 TABLET, FILM COATED ORAL AT BEDTIME
Qty: 30 TABLET | Refills: 1 | Status: SHIPPED | OUTPATIENT
Start: 2022-02-03 | End: 2022-04-08

## 2022-02-03 RX ORDER — DESVENLAFAXINE 50 MG/1
50 TABLET, FILM COATED, EXTENDED RELEASE ORAL DAILY
Qty: 30 TABLET | Refills: 3 | Status: SHIPPED | OUTPATIENT
Start: 2022-02-03

## 2022-02-03 ASSESSMENT — ANXIETY QUESTIONNAIRES
7. FEELING AFRAID AS IF SOMETHING AWFUL MIGHT HAPPEN: NEARLY EVERY DAY
5. BEING SO RESTLESS THAT IT IS HARD TO SIT STILL: MORE THAN HALF THE DAYS
IF YOU CHECKED OFF ANY PROBLEMS ON THIS QUESTIONNAIRE, HOW DIFFICULT HAVE THESE PROBLEMS MADE IT FOR YOU TO DO YOUR WORK, TAKE CARE OF THINGS AT HOME, OR GET ALONG WITH OTHER PEOPLE: EXTREMELY DIFFICULT
3. WORRYING TOO MUCH ABOUT DIFFERENT THINGS: NEARLY EVERY DAY
GAD7 TOTAL SCORE: 20
6. BECOMING EASILY ANNOYED OR IRRITABLE: NEARLY EVERY DAY
2. NOT BEING ABLE TO STOP OR CONTROL WORRYING: NEARLY EVERY DAY
1. FEELING NERVOUS, ANXIOUS, OR ON EDGE: NEARLY EVERY DAY

## 2022-02-03 ASSESSMENT — PATIENT HEALTH QUESTIONNAIRE - PHQ9
5. POOR APPETITE OR OVEREATING: NEARLY EVERY DAY
SUM OF ALL RESPONSES TO PHQ QUESTIONS 1-9: 23

## 2022-02-03 NOTE — PROGRESS NOTES
Assessment/Plan:    Moderate major depression (H)  Anxiety  Uncontrolled anxiety and depression, not currently taking any medications (reports last meds weren't helpful/working). Pt has now tried 3 SSRIs and 1 SNRI + other panic attack meds as below. Given difficulty getting symptoms controlled, recommend referral back to psychiatry for stabilization; pt agrees, referral placed today. In the meantime, will start pristiq daily, seroquel PRN for anxiety/panic, trazodone at bedtime PRN.   - desvenlafaxine (PRISTIQ) 50 MG 24 hr tablet  Dispense: 30 tablet; Refill: 3  - [facility] Referral  - traZODone (DESYREL) 50 MG tablet  Dispense: 30 tablet; Refill: 1  - QUEtiapine (SEROQUEL) 25 MG tablet  Dispense: 60 tablet; Refill: 1       Follow up: 1 month (if not yet seeing psychiatrist)    Love Benitez MD  [facility]    Subjective:   Janet Rivera is a 26 year old female is here today for anxiety/depression    -had been taking lexapro (stopped July or Aug - didn't feel like it was working very well) - not currently taking any medications  -also was trying buspar - taking as needed, reports not helpful  -previously tried: effexor, gabapentin, propranolol, zoloft, celexa  -can get through work but then lays in bed, sleep is off/irregular schedule, difficulty falling asleep  -PHQ9 score 23, extremely difficult to function, no thoughts of self-harm  -GAD7 score 20, extremely difficult to function  -panic attacks at least once per day       Patient Active Problem List   Diagnosis     Acne     Dysmenorrhea     Weight loss, unintentional     Anxiety     Depression, major, single episode     Vitamin D deficiency     Moderate major depression (H)     Panic attacks     History reviewed. No pertinent past medical history.  History reviewed. No pertinent surgical history.  Current Outpatient Medications   Medication     desvenlafaxine (PRISTIQ) 50 MG 24 hr tablet     medroxyprogesterone acetate  (DEPO-PROVERA IM)     QUEtiapine (SEROQUEL) 25 MG tablet     traZODone (DESYREL) 50 MG tablet     busPIRone (BUSPAR) 10 MG tablet     escitalopram oxalate (LEXAPRO) 20 MG tablet     No current facility-administered medications for this visit.     No Known Allergies  Social History     Socioeconomic History     Marital status: Single     Spouse name: Not on file     Number of children: Not on file     Years of education: Not on file     Highest education level: Not on file   Occupational History     Not on file   Tobacco Use     Smoking status: Current Every Day Smoker     Packs/day: 0.30     Smokeless tobacco: Never Used   Substance and Sexual Activity     Alcohol use: Yes     Alcohol/week: 2.0 - 3.0 standard drinks     Drug use: Never     Sexual activity: Not on file   Other Topics Concern     Not on file   Social History Narrative     Not on file     Social Determinants of Health     Financial Resource Strain: Not on file   Food Insecurity: Not on file   Transportation Needs: Not on file   Physical Activity: Not on file   Stress: Not on file   Social Connections: Not on file   Intimate Partner Violence: Not on file   Housing Stability: Not on file     Family History   Problem Relation Age of Onset     Substance Abuse Mother      Substance Abuse Maternal Uncle      Review of systems is as stated in HPI, and the remainder of system review is otherwise negative.    Objective:     /62 (BP Location: Right arm, Patient Position: Sitting, Cuff Size: Adult Regular)   Wt 49.1 kg (108 lb 3.2 oz)   BMI 19.17 kg/m      General appearance: awake, NAD  HEENT: atraumatic, normocephalic, no scleral icterus or injection  Lungs: breathing comfortably on room air  Extremities: moving all extremities  Neuro: alert, oriented x3, CNs grossly intact, no focal deficits appreciated  Psych: depressed appearing, somewhat flat affect, answering questions appropriately, linear thought process

## 2022-02-03 NOTE — PATIENT INSTRUCTIONS
Start pristiq (desvenlafaxine) 50mg daily for anxiety/depression management    Start trazodone to help with sleep - don't start pristiq and trazodone at same time, start 1 week later so we can make sure no side effects     Start seroquel 25mg three times daily as needed for anxiety/panic    Referral to psychiatry

## 2022-02-04 ASSESSMENT — ANXIETY QUESTIONNAIRES: GAD7 TOTAL SCORE: 20

## 2022-04-07 DIAGNOSIS — F41.9 ANXIETY: ICD-10-CM

## 2022-04-07 DIAGNOSIS — F32.1 MODERATE MAJOR DEPRESSION (H): ICD-10-CM

## 2022-04-08 RX ORDER — TRAZODONE HYDROCHLORIDE 50 MG/1
TABLET, FILM COATED ORAL
Qty: 90 TABLET | Refills: 3 | Status: SHIPPED | OUTPATIENT
Start: 2022-04-08

## 2022-04-08 NOTE — TELEPHONE ENCOUNTER
"Last Written Prescription Date:  2/3/22  Last Fill Quantity: 30,  # refills: 1   Last office visit provider:  2/3/22     Requested Prescriptions   Pending Prescriptions Disp Refills     traZODone (DESYREL) 50 MG tablet [Pharmacy Med Name: TRAZODONE 50MG TABLETS] 30 tablet 1     Sig: TAKE 1 TABLET(50 MG) BY MOUTH AT BEDTIME       Serotonin Modulators Passed - 4/8/2022  9:05 AM        Passed - Recent (12 mo) or future (30 days) visit within the authorizing provider's specialty     Patient has had an office visit with the authorizing provider or a provider within the authorizing providers department within the previous 12 mos or has a future within next 30 days. See \"Patient Info\" tab in inbasket, or \"Choose Columns\" in Meds & Orders section of the refill encounter.              Passed - Medication is active on med list        Passed - Patient is age 18 or older        Passed - No active pregnancy on record        Passed - No positive pregnancy test in past 12 months             Earnest Zhang RN 04/08/22 9:05 AM  "

## 2022-07-23 ENCOUNTER — HEALTH MAINTENANCE LETTER (OUTPATIENT)
Age: 27
End: 2022-07-23

## 2022-10-01 ENCOUNTER — HEALTH MAINTENANCE LETTER (OUTPATIENT)
Age: 27
End: 2022-10-01

## 2023-08-06 ENCOUNTER — HEALTH MAINTENANCE LETTER (OUTPATIENT)
Age: 28
End: 2023-08-06

## 2024-09-29 ENCOUNTER — HEALTH MAINTENANCE LETTER (OUTPATIENT)
Age: 29
End: 2024-09-29